# Patient Record
Sex: FEMALE | Employment: FULL TIME | URBAN - METROPOLITAN AREA
[De-identification: names, ages, dates, MRNs, and addresses within clinical notes are randomized per-mention and may not be internally consistent; named-entity substitution may affect disease eponyms.]

---

## 2017-06-27 ENCOUNTER — OP HISTORICAL/CONVERTED ENCOUNTER (OUTPATIENT)
Dept: OTHER | Age: 32
End: 2017-06-27

## 2017-06-28 ENCOUNTER — IP HISTORICAL/CONVERTED ENCOUNTER (OUTPATIENT)
Dept: OTHER | Age: 32
End: 2017-06-28

## 2019-11-14 ENCOUNTER — HOSPITAL ENCOUNTER (OUTPATIENT)
Dept: PREADMISSION TESTING | Age: 34
Discharge: HOME | End: 2019-11-14
Payer: SELF-PAY

## 2019-11-14 VITALS
HEART RATE: 66 BPM | SYSTOLIC BLOOD PRESSURE: 119 MMHG | BODY MASS INDEX: 27.91 KG/M2 | OXYGEN SATURATION: 99 % | HEIGHT: 68 IN | RESPIRATION RATE: 16 BRPM | WEIGHT: 184.13 LBS | TEMPERATURE: 98 F | DIASTOLIC BLOOD PRESSURE: 76 MMHG

## 2019-11-14 LAB
ABO + RH BLD: NORMAL
APTT PPP: 27.9 SEC (ref 22.1–32)
BASOPHILS # BLD: 0.1 K/UL (ref 0–0.1)
BASOPHILS NFR BLD: 1 % (ref 0–1)
BLOOD GROUP ANTIBODIES SERPL: NORMAL
DIFFERENTIAL METHOD BLD: NORMAL
EOSINOPHIL # BLD: 0.1 K/UL (ref 0–0.4)
EOSINOPHIL NFR BLD: 1 % (ref 0–7)
ERYTHROCYTE [DISTWIDTH] IN BLOOD BY AUTOMATED COUNT: 11.6 % (ref 11.5–14.5)
HCT VFR BLD AUTO: 41.8 % (ref 35–47)
HGB BLD-MCNC: 13.6 G/DL (ref 11.5–16)
IMM GRANULOCYTES # BLD AUTO: 0 K/UL (ref 0–0.04)
IMM GRANULOCYTES NFR BLD AUTO: 0 % (ref 0–0.5)
INR PPP: 1 (ref 0.9–1.1)
LYMPHOCYTES # BLD: 2.6 K/UL (ref 0.8–3.5)
LYMPHOCYTES NFR BLD: 41 % (ref 12–49)
MCH RBC QN AUTO: 29.7 PG (ref 26–34)
MCHC RBC AUTO-ENTMCNC: 32.5 G/DL (ref 30–36.5)
MCV RBC AUTO: 91.3 FL (ref 80–99)
MONOCYTES # BLD: 0.4 K/UL (ref 0–1)
MONOCYTES NFR BLD: 7 % (ref 5–13)
NEUTS SEG # BLD: 3.1 K/UL (ref 1.8–8)
NEUTS SEG NFR BLD: 50 % (ref 32–75)
NRBC # BLD: 0 K/UL (ref 0–0.01)
NRBC BLD-RTO: 0 PER 100 WBC
PLATELET # BLD AUTO: 295 K/UL (ref 150–400)
PMV BLD AUTO: 10.5 FL (ref 8.9–12.9)
PREALB SERPL-MCNC: 25.3 MG/DL (ref 20–40)
PROTHROMBIN TIME: 10.2 SEC (ref 9–11.1)
RBC # BLD AUTO: 4.58 M/UL (ref 3.8–5.2)
SPECIMEN EXP DATE BLD: NORMAL
THERAPEUTIC RANGE,PTTT: NORMAL SECS (ref 58–77)
WBC # BLD AUTO: 6.3 K/UL (ref 3.6–11)

## 2019-11-14 PROCEDURE — 84134 ASSAY OF PREALBUMIN: CPT

## 2019-11-14 PROCEDURE — 85730 THROMBOPLASTIN TIME PARTIAL: CPT

## 2019-11-14 PROCEDURE — 36415 COLL VENOUS BLD VENIPUNCTURE: CPT

## 2019-11-14 PROCEDURE — 85610 PROTHROMBIN TIME: CPT

## 2019-11-14 PROCEDURE — 85025 COMPLETE CBC W/AUTO DIFF WBC: CPT

## 2019-11-14 PROCEDURE — 86900 BLOOD TYPING SEROLOGIC ABO: CPT

## 2019-11-14 RX ORDER — ACETAMINOPHEN 325 MG/1
650 TABLET ORAL AS NEEDED
COMMUNITY
End: 2021-06-26

## 2019-11-14 RX ORDER — ALPRAZOLAM 0.25 MG/1
0.25 TABLET ORAL AS NEEDED
COMMUNITY
End: 2021-06-26

## 2019-11-14 RX ORDER — PEDIATRIC MULTIVITAMIN NO.17
2 TABLET,CHEWABLE ORAL DAILY
COMMUNITY
End: 2021-06-26

## 2019-11-14 RX ORDER — ADHESIVE BANDAGE
15 BANDAGE TOPICAL AS NEEDED
COMMUNITY
End: 2021-06-26

## 2019-11-14 NOTE — PERIOP NOTES
[address] , [address] [facility]   MAIN OR                                  [phone]   MAIN PRE OP                          [phone]                                                                                AMBULATORY PRE OP          [phone]  PRE-ADMISSION TESTING    [phone]     Surgery Date:   Monday November 25, 2019        Is surgery arrival time given by surgeon? NO  If NO, [address] staff will call you between 3 and 7pm the day before your surgery with your arrival time. (If your surgery is on a Monday, we will call you the Friday before.)    Call [phone] after 7pm Monday-Friday if you did not receive this call. INSTRUCTIONS BEFORE YOUR SURGERY   When You  Arrive Arrive at the [address]nd [address] [facility] on the day of your surgery  Have your insurance card, photo ID, and any copayment (if needed)   Food   and   Drink NO food or drink after midnight the night before surgery    This means NO water, gum, mints, coffee, juice, etc.  No alcohol (beer, wine, liquor) 24 hours before and after surgery   Medications to   TAKE   Morning of Surgery MEDICATIONS TO TAKE THE MORNING OF SURGERY WITH A SIP OF WATER:    Xanax if needed   Medications  To  STOP      7 days before surgery  Non-Steroidal anti-inflammatory Drugs (NSAID's): for example, Ibuprofen (Advil, Motrin), Naproxen (Aleve)   Aspirin, if taking for pain    Herbal supplements, vitamins, and fish oil   Other:  (Pain medications not listed above, including Tylenol may be taken)   Blood  Thinners  If you take  Aspirin, Plavix, Coumadin, or any blood-thinning or anti-blood clot medicine, talk to the doctor who prescribed the medications for pre-operative instructions.    Bathing Clothing  Jewelry  Valuables      If you shower the morning of surgery, please do not apply anything to your skin (lotions, powders, deodorant, or makeup, especially celio)   Follow Chlorhexidine Care Fusion body wash instructions provided to you during PAT appointment. Begin 3 days prior to surgery.  Do not shave or trim anywhere 24 hours before surgery   Wear your hair loose or down; no pony-tails, buns, or metal hair clips   Wear loose, comfortable, clean clothes   Wear glasses instead of contacts   Leave money, valuables, and jewelry, including body piercings, at home   Going Home - or Spending the Night  SAME-DAY SURGERY: You must have a responsible adult drive you home and stay with you 24 hours after surgery   ADMITS: If your doctor is keeping you in the hospital after surgery, leave personal belongings/luggage in your car until you have a hospital room number. Hospital discharge time is 12 noon  Drivers must be here before 12 noon unless you are told differently   Special Instructions   Special Instructions:  · Use Chlorhexidine Care Fusion wash and sponges 3 days prior to surgery as instructed. · Incentive spirometer given with instructions to practice at home and bring back to the hospital on the day of surgery. · [facility] will contact you if your Hemoglobin A1C is greater than 7.5. · Ensure/Glucerna  sample, nutritional information, and Ensure/Glucerna coupon given. · Pain pamphlet and Call Don't Fall reminder reviewed with patient. ·  parking is complimentary Monday - Friday 7 am - 5 pm  · Bring PTA Medication list day of surgery with the last doses taken documented     Follow all instructions so your surgery wont be cancelled. Please, be on time. If a situation occurs and you are delayed the day of surgery, call  [phone]. If your physical condition changes (like a fever, cold, flu, etc.) call your surgeon. Home medication(s) reviewed and verified verbally with patient during PAT appointment. The patient was contacted  in person.    The patient verbalizes understanding of all instructions and does not  need reinforcement.

## 2019-11-15 NOTE — H&P
Preoperative Evaluation                     History and Physical with Surgical Risk Stratification     11/14/2019    CC: Cosmetic  Surgery: Bilateral breast lift with Implant and Abdominoplasty     HPI:   Richard Urbina is a 36 y.o. female referred for pre-operative evaluation by Dr. Sukumar Abdalla for surgery on 11/25/19. Ms. Fariha Cheatham states she had gastric bypass surgery and has lost a tremendous amount of weight causing excessive skin to hang. She has already had the skin removed from her arms and now wishes to have her stomach and breasts done. The patient was evaluated in the surgeon's office and it was determined that the most appropriate plan of care is to proceed with surgical intervention. Patient's PCP Mary Leigh MD    Review of Systems     Constitutional: Negative for chills and fever  HENT: Negative for congestion and sore throat  Eyes: negative for blurred vision and double vision  Respiratory: Negative for cough, shortness of breath and wheezing  Mouth: Negative for loose, broken or chipped teeth. Cardiovascular: Negative for chest pain and palpitations  Gastrointestinal: Negative for abdominal pain, constipation, diarrhea and nausea  Genitourinary: Negative for dysuria and hematuria  Musculoskeletal: Negative for joint pain  Skin: Negative for rash, open wounds. Negative for bruises easily  Neurological: Negative for dizziness, tremors and headaches  Psychiatric: Negative for depression. The patient is not nervous/anxious.     Inherent Risk of Surgery     Surgical risk:  Intermediate  Low:  EIntermediate:   abdominal,High:    Patient Cardiac Risk Assessment     Revised Cardiac Risk Index (RCRI)    Rate if cardiac death, nonfatal MI, nonfatal cardiac arrest by number of risk factor- 0.4%    VARSHA/AHA 2007 Guidelines:   1) Surgery Emergency, Non-cardiac -> to surgery  2) If not, look at clinical predictors    Major Intermediate Minor       Blood Thinner: None    METS     >4 METS Climb a flight of stairs or a hill Walk on level ground at 4 mph Run a short distance Heavy work around house (scrub floors, move furniture)     Other Risk Factors:   Screening for ETOH use:  Done and low risk  Smoking status:   None    Personal or FH of bleeding problems:  No  Personal or FH of blood clots:  No  Personal or FH of anesthesia problems:   No    Pulmonary Risk:  Asthma or COPD:  No  Body mass index is 28.41 kg/m². Known MINDY:  No    Past Medical, Surgical, Social History     Allergies: Allergies   Allergen Reactions    Ayala Other (comments)     Mouth sores       Patient did bring in medication list/bottles to review.  Medications were reviewed verbally    Past Medical History:   Diagnosis Date    Anxiety     Constipation      Past Surgical History:   Procedure Laterality Date    HX ABDOMINAL LAPAROSCOPY N/A     HX GASTRIC BYPASS N/A     HX OTHER SURGICAL Bilateral 2019    Brachioplasty    HX TENDON / LIGAMENT TRANSPLANT Left 2016    Foot- with hardware    HX TONSILLECTOMY       Social History     Tobacco Use    Smoking status: Former Smoker     Packs/day: 1.00     Years: 12.00     Pack years: 12.00     Types: Cigarettes     Last attempt to quit: 2016     Years since quittin.9    Smokeless tobacco: Never Used   Substance Use Topics    Alcohol use: Yes     Comment: occ    Drug use: Never     Family History   Problem Relation Age of Onset    No Known Problems Mother     High Cholesterol Father     Diabetes Father     Hypertension Father     Malignant Hyperthermia Neg Hx        Objective     Vitals:    19 1012   BP: 119/76   Pulse: 66   Resp: 16   Temp: 98 °F (36.7 °C)   SpO2: 99%   Weight: 83.5 kg (184 lb 2 oz)   Height: 5' 7.5\" (1.715 m)       Constitutional:  Appears well,  No Acute Distress, Vitals noted  Psychiatric:   Affect normal, Alert and Oriented to person/place/time    Eyes:   Pupils equally round and reactive, EOMI, conjunctiva clear, eyelids normal  ENT:   External ears and nose normal/lips, teeth normal, gums normal, TMs and Orophyarynx normal  Neck:   General inspection and Thyroid normal.  No abnormal cervical or supraclavicular nodes    Lungs:   Clear to auscultation, good respiratory effort  Heart: Ausculation normal.  Regular rhythm. No cardiac murmurs. No carotid bruits or palpable thrills  Chest wall normal  Musculoskeletal: Normal gait  Extremities:   Without edema, good peripheral pulses  Skin:   Warm to palpation, without rashes, bruising, or suspicious lesions     Recent Results (from the past 72 hour(s))   CBC WITH AUTOMATED DIFF    Collection Time: 11/14/19 10:56 AM   Result Value Ref Range    WBC 6.3 3.6 - 11.0 K/uL    RBC 4.58 3.80 - 5.20 M/uL    HGB 13.6 11.5 - 16.0 g/dL    HCT 41.8 35.0 - 47.0 %    MCV 91.3 80.0 - 99.0 FL    MCH 29.7 26.0 - 34.0 PG    MCHC 32.5 30.0 - 36.5 g/dL    RDW 11.6 11.5 - 14.5 %    PLATELET 303 633 - 757 K/uL    MPV 10.5 8.9 - 12.9 FL    NRBC 0.0 0  WBC    ABSOLUTE NRBC 0.00 0.00 - 0.01 K/uL    NEUTROPHILS 50 32 - 75 %    LYMPHOCYTES 41 12 - 49 %    MONOCYTES 7 5 - 13 %    EOSINOPHILS 1 0 - 7 %    BASOPHILS 1 0 - 1 %    IMMATURE GRANULOCYTES 0 0.0 - 0.5 %    ABS. NEUTROPHILS 3.1 1.8 - 8.0 K/UL    ABS. LYMPHOCYTES 2.6 0.8 - 3.5 K/UL    ABS. MONOCYTES 0.4 0.0 - 1.0 K/UL    ABS. EOSINOPHILS 0.1 0.0 - 0.4 K/UL    ABS. BASOPHILS 0.1 0.0 - 0.1 K/UL    ABS. IMM.  GRANS. 0.0 0.00 - 0.04 K/UL    DF AUTOMATED     PREALBUMIN    Collection Time: 11/14/19 10:56 AM   Result Value Ref Range    Prealbumin 25.3 20.0 - 40.0 mg/dL   PROTHROMBIN TIME + INR    Collection Time: 11/14/19 10:56 AM   Result Value Ref Range    INR 1.0 0.9 - 1.1      Prothrombin time 10.2 9.0 - 11.1 sec   PTT    Collection Time: 11/14/19 10:56 AM   Result Value Ref Range    aPTT 27.9 22.1 - 32.0 sec    aPTT, therapeutic range     58.0 - 77.0 SECS   TYPE & SCREEN    Collection Time: 11/14/19 10:56 AM   Result Value Ref Range    Crossmatch Expiration 11/28/2019     ABO/Rh(D) O POSITIVE     Antibody screen NEG        Assessment and Plan     Assessment/Plan:   1) Cosmetic  2) Pre-Operative Evaluation    Labs reviewed. Preoperative Clearance  Per RCRI, the patient has a 0.4% risk of cardiac death, nonfatal MI, nonfatal cardiac arrest based on no risk factors. Per ACC/AHA guidelines, patient is low risk for a(n) intermediate risk surgery and may proceed to planned surgery with the above noted risk.     Jose Orantes NP

## 2019-11-22 ENCOUNTER — ANESTHESIA EVENT (OUTPATIENT)
Dept: SURGERY | Age: 34
End: 2019-11-22
Payer: SELF-PAY

## 2019-11-24 NOTE — ANESTHESIA PREPROCEDURE EVALUATION
Relevant Problems   No relevant active problems       Anesthetic History   No history of anesthetic complications            Review of Systems / Medical History  Patient summary reviewed and pertinent labs reviewed    Pulmonary  Within defined limits                 Neuro/Psych   Within defined limits           Cardiovascular                  Exercise tolerance: >4 METS     GI/Hepatic/Renal  Within defined limits              Endo/Other        Obesity     Other Findings   Comments: Gastric Bypass 2017, with 150 lbs  weight loss.          Physical Exam    Airway  Mallampati: II  TM Distance: 4 - 6 cm  Neck ROM: normal range of motion   Mouth opening: Normal     Cardiovascular    Rhythm: regular  Rate: normal         Dental  No notable dental hx       Pulmonary  Breath sounds clear to auscultation               Abdominal  GI exam deferred       Other Findings            Anesthetic Plan    ASA: 2  Anesthesia type: general          Induction: Intravenous  Anesthetic plan and risks discussed with: Patient

## 2019-11-25 ENCOUNTER — HOSPITAL ENCOUNTER (OUTPATIENT)
Age: 34
Discharge: HOME | End: 2019-11-26
Attending: PLASTIC SURGERY | Admitting: PLASTIC SURGERY
Payer: SELF-PAY

## 2019-11-25 ENCOUNTER — ANESTHESIA (OUTPATIENT)
Dept: SURGERY | Age: 34
End: 2019-11-25
Payer: SELF-PAY

## 2019-11-25 PROBLEM — Z98.890 S/P ABDOMINOPLASTY: Status: ACTIVE | Noted: 2019-11-25

## 2019-11-25 LAB — HCG UR QL: NEGATIVE

## 2019-11-25 PROCEDURE — 74011000250 HC RX REV CODE- 250: Performed by: PLASTIC SURGERY

## 2019-11-25 PROCEDURE — 99218 HC RM OBSERVATION: CPT

## 2019-11-25 PROCEDURE — 74011250636 HC RX REV CODE- 250/636: Performed by: PLASTIC SURGERY

## 2019-11-25 PROCEDURE — 77030040506 HC DRN WND MDII -A: Performed by: PLASTIC SURGERY

## 2019-11-25 PROCEDURE — 77030011264 HC ELECTRD BLD EXT COVD -A: Performed by: PLASTIC SURGERY

## 2019-11-25 PROCEDURE — 77030002991 HC SUT QUILL SSPC -B: Performed by: PLASTIC SURGERY

## 2019-11-25 PROCEDURE — 77030036554: Performed by: PLASTIC SURGERY

## 2019-11-25 PROCEDURE — 77030002986 HC SUT PROL J&J -A: Performed by: PLASTIC SURGERY

## 2019-11-25 PROCEDURE — 77030008534 HC TBNG LIPOSUC BYRO -B: Performed by: PLASTIC SURGERY

## 2019-11-25 PROCEDURE — 77030002966 HC SUT PDS J&J -A: Performed by: PLASTIC SURGERY

## 2019-11-25 PROCEDURE — 77030008467 HC STPLR SKN COVD -B: Performed by: PLASTIC SURGERY

## 2019-11-25 PROCEDURE — 77030011267 HC ELECTRD BLD COVD -A: Performed by: PLASTIC SURGERY

## 2019-11-25 PROCEDURE — 77030011266 HC ELECTRD BLD INSL COVD -A: Performed by: PLASTIC SURGERY

## 2019-11-25 PROCEDURE — 74011000250 HC RX REV CODE- 250: Performed by: NURSE ANESTHETIST, CERTIFIED REGISTERED

## 2019-11-25 PROCEDURE — 76210000036 HC AMBSU PH I REC 1.5 TO 2 HR: Performed by: PLASTIC SURGERY

## 2019-11-25 PROCEDURE — 77030002888 HC SUT CHRMC J&J -A: Performed by: PLASTIC SURGERY

## 2019-11-25 PROCEDURE — 77030008526 HC TBNG ASPIR DISP MCRA -B: Performed by: PLASTIC SURGERY

## 2019-11-25 PROCEDURE — 77030033138 HC SUT PGA STRATFX J&J -B: Performed by: PLASTIC SURGERY

## 2019-11-25 PROCEDURE — 77030011283 HC ELECTRD NDL COVD -A: Performed by: PLASTIC SURGERY

## 2019-11-25 PROCEDURE — 76210000017 HC OR PH I REC 1.5 TO 2 HR

## 2019-11-25 PROCEDURE — 74011250636 HC RX REV CODE- 250/636: Performed by: ANESTHESIOLOGY

## 2019-11-25 PROCEDURE — 77030031139 HC SUT VCRL2 J&J -A: Performed by: PLASTIC SURGERY

## 2019-11-25 PROCEDURE — 74011250637 HC RX REV CODE- 250/637: Performed by: PLASTIC SURGERY

## 2019-11-25 PROCEDURE — 77030040361 HC SLV COMPR DVT MDII -B

## 2019-11-25 PROCEDURE — 77030002933 HC SUT MCRYL J&J -A: Performed by: PLASTIC SURGERY

## 2019-11-25 PROCEDURE — 77030005513 HC CATH URETH FOL11 MDII -B: Performed by: PLASTIC SURGERY

## 2019-11-25 PROCEDURE — 77030002996 HC SUT SLK J&J -A: Performed by: PLASTIC SURGERY

## 2019-11-25 PROCEDURE — 74011000250 HC RX REV CODE- 250: Performed by: ANESTHESIOLOGY

## 2019-11-25 PROCEDURE — 77030013079 HC BLNKT BAIR HGGR 3M -A: Performed by: ANESTHESIOLOGY

## 2019-11-25 PROCEDURE — 74011000272 HC RX REV CODE- 272: Performed by: PLASTIC SURGERY

## 2019-11-25 PROCEDURE — 76060000074: Performed by: PLASTIC SURGERY

## 2019-11-25 PROCEDURE — 77030022704 HC SUT VLOC COVD -B: Performed by: PLASTIC SURGERY

## 2019-11-25 PROCEDURE — 76060000071 HC AMB SURG ANES 6 TO 6.5 HR: Performed by: PLASTIC SURGERY

## 2019-11-25 PROCEDURE — 76030000012 HC AMB SURG OR TIME 6 TO 6.5: Performed by: PLASTIC SURGERY

## 2019-11-25 PROCEDURE — 74011000258 HC RX REV CODE- 258: Performed by: PLASTIC SURGERY

## 2019-11-25 PROCEDURE — 81025 URINE PREGNANCY TEST: CPT

## 2019-11-25 PROCEDURE — 77030018673: Performed by: PLASTIC SURGERY

## 2019-11-25 PROCEDURE — 77030040504 HC DRN WND MDII -B: Performed by: PLASTIC SURGERY

## 2019-11-25 PROCEDURE — 76030000013 HC AMB SURG OR TIME ADDL 0.5: Performed by: PLASTIC SURGERY

## 2019-11-25 PROCEDURE — 77030018836 HC SOL IRR NACL ICUM -A: Performed by: PLASTIC SURGERY

## 2019-11-25 PROCEDURE — 77030008684 HC TU ET CUF COVD -B: Performed by: ANESTHESIOLOGY

## 2019-11-25 PROCEDURE — 74011250636 HC RX REV CODE- 250/636: Performed by: NURSE ANESTHETIST, CERTIFIED REGISTERED

## 2019-11-25 PROCEDURE — 77030013358: Performed by: PLASTIC SURGERY

## 2019-11-25 PROCEDURE — 77030034479 HC ADH SKN CLSR PRINEO J&J -B: Performed by: PLASTIC SURGERY

## 2019-11-25 PROCEDURE — 77030008463 HC STPLR SKN PROX J&J -B: Performed by: PLASTIC SURGERY

## 2019-11-25 RX ORDER — ONDANSETRON 2 MG/ML
INJECTION INTRAMUSCULAR; INTRAVENOUS AS NEEDED
Status: DISCONTINUED | OUTPATIENT
Start: 2019-11-25 | End: 2019-11-25 | Stop reason: HOSPADM

## 2019-11-25 RX ORDER — SODIUM CHLORIDE, SODIUM LACTATE, POTASSIUM CHLORIDE, CALCIUM CHLORIDE 600; 310; 30; 20 MG/100ML; MG/100ML; MG/100ML; MG/100ML
125 INJECTION, SOLUTION INTRAVENOUS CONTINUOUS
Status: DISCONTINUED | OUTPATIENT
Start: 2019-11-25 | End: 2019-11-25 | Stop reason: HOSPADM

## 2019-11-25 RX ORDER — MIDAZOLAM HYDROCHLORIDE 1 MG/ML
INJECTION, SOLUTION INTRAMUSCULAR; INTRAVENOUS AS NEEDED
Status: DISCONTINUED | OUTPATIENT
Start: 2019-11-25 | End: 2019-11-25 | Stop reason: HOSPADM

## 2019-11-25 RX ORDER — LIDOCAINE HYDROCHLORIDE 10 MG/ML
0.1 INJECTION, SOLUTION EPIDURAL; INFILTRATION; INTRACAUDAL; PERINEURAL AS NEEDED
Status: DISCONTINUED | OUTPATIENT
Start: 2019-11-25 | End: 2019-11-25 | Stop reason: HOSPADM

## 2019-11-25 RX ORDER — ALBUTEROL SULFATE 0.83 MG/ML
2.5 SOLUTION RESPIRATORY (INHALATION) AS NEEDED
Status: DISCONTINUED | OUTPATIENT
Start: 2019-11-25 | End: 2019-11-25 | Stop reason: HOSPADM

## 2019-11-25 RX ORDER — OXYCODONE HYDROCHLORIDE 5 MG/1
10 TABLET ORAL
Status: DISCONTINUED | OUTPATIENT
Start: 2019-11-25 | End: 2019-11-26

## 2019-11-25 RX ORDER — SODIUM CHLORIDE 0.9 % (FLUSH) 0.9 %
5-40 SYRINGE (ML) INJECTION EVERY 8 HOURS
Status: DISCONTINUED | OUTPATIENT
Start: 2019-11-25 | End: 2019-11-26 | Stop reason: HOSPADM

## 2019-11-25 RX ORDER — SODIUM CHLORIDE 0.9 % (FLUSH) 0.9 %
5-40 SYRINGE (ML) INJECTION AS NEEDED
Status: DISCONTINUED | OUTPATIENT
Start: 2019-11-25 | End: 2019-11-26 | Stop reason: HOSPADM

## 2019-11-25 RX ORDER — PHENYLEPHRINE HCL IN 0.9% NACL 0.4MG/10ML
SYRINGE (ML) INTRAVENOUS AS NEEDED
Status: DISCONTINUED | OUTPATIENT
Start: 2019-11-25 | End: 2019-11-25 | Stop reason: HOSPADM

## 2019-11-25 RX ORDER — ROCURONIUM BROMIDE 10 MG/ML
INJECTION, SOLUTION INTRAVENOUS AS NEEDED
Status: DISCONTINUED | OUTPATIENT
Start: 2019-11-25 | End: 2019-11-25 | Stop reason: HOSPADM

## 2019-11-25 RX ORDER — PROPOFOL 10 MG/ML
INJECTION, EMULSION INTRAVENOUS AS NEEDED
Status: DISCONTINUED | OUTPATIENT
Start: 2019-11-25 | End: 2019-11-25 | Stop reason: HOSPADM

## 2019-11-25 RX ORDER — GLYCOPYRROLATE 0.2 MG/ML
INJECTION INTRAMUSCULAR; INTRAVENOUS AS NEEDED
Status: DISCONTINUED | OUTPATIENT
Start: 2019-11-25 | End: 2019-11-25 | Stop reason: HOSPADM

## 2019-11-25 RX ORDER — CEFAZOLIN SODIUM/WATER 2 G/20 ML
2 SYRINGE (ML) INTRAVENOUS EVERY 8 HOURS
Status: DISCONTINUED | OUTPATIENT
Start: 2019-11-25 | End: 2019-11-25

## 2019-11-25 RX ORDER — MORPHINE SULFATE 2 MG/ML
2 INJECTION, SOLUTION INTRAMUSCULAR; INTRAVENOUS
Status: DISCONTINUED | OUTPATIENT
Start: 2019-11-25 | End: 2019-11-26 | Stop reason: HOSPADM

## 2019-11-25 RX ORDER — ONDANSETRON 2 MG/ML
4 INJECTION INTRAMUSCULAR; INTRAVENOUS AS NEEDED
Status: DISCONTINUED | OUTPATIENT
Start: 2019-11-25 | End: 2019-11-25 | Stop reason: HOSPADM

## 2019-11-25 RX ORDER — DEXAMETHASONE SODIUM PHOSPHATE 4 MG/ML
INJECTION, SOLUTION INTRA-ARTICULAR; INTRALESIONAL; INTRAMUSCULAR; INTRAVENOUS; SOFT TISSUE AS NEEDED
Status: DISCONTINUED | OUTPATIENT
Start: 2019-11-25 | End: 2019-11-25 | Stop reason: HOSPADM

## 2019-11-25 RX ORDER — DIPHENHYDRAMINE HYDROCHLORIDE 50 MG/ML
12.5 INJECTION, SOLUTION INTRAMUSCULAR; INTRAVENOUS AS NEEDED
Status: DISCONTINUED | OUTPATIENT
Start: 2019-11-25 | End: 2019-11-25 | Stop reason: HOSPADM

## 2019-11-25 RX ORDER — HEPARIN SODIUM 5000 [USP'U]/ML
5000 INJECTION, SOLUTION INTRAVENOUS; SUBCUTANEOUS EVERY 8 HOURS
Status: DISCONTINUED | OUTPATIENT
Start: 2019-11-26 | End: 2019-11-26 | Stop reason: HOSPADM

## 2019-11-25 RX ORDER — SODIUM CHLORIDE, SODIUM LACTATE, POTASSIUM CHLORIDE, CALCIUM CHLORIDE 600; 310; 30; 20 MG/100ML; MG/100ML; MG/100ML; MG/100ML
150 INJECTION, SOLUTION INTRAVENOUS CONTINUOUS
Status: DISCONTINUED | OUTPATIENT
Start: 2019-11-25 | End: 2019-11-25 | Stop reason: HOSPADM

## 2019-11-25 RX ORDER — HYDROMORPHONE HYDROCHLORIDE 2 MG/ML
INJECTION, SOLUTION INTRAMUSCULAR; INTRAVENOUS; SUBCUTANEOUS AS NEEDED
Status: DISCONTINUED | OUTPATIENT
Start: 2019-11-25 | End: 2019-11-25 | Stop reason: HOSPADM

## 2019-11-25 RX ORDER — NEOSTIGMINE METHYLSULFATE 1 MG/ML
INJECTION, SOLUTION INTRAVENOUS AS NEEDED
Status: DISCONTINUED | OUTPATIENT
Start: 2019-11-25 | End: 2019-11-25 | Stop reason: HOSPADM

## 2019-11-25 RX ORDER — MORPHINE SULFATE 2 MG/ML
2 INJECTION, SOLUTION INTRAMUSCULAR; INTRAVENOUS
Status: DISCONTINUED | OUTPATIENT
Start: 2019-11-25 | End: 2019-11-25

## 2019-11-25 RX ORDER — OXYCODONE AND ACETAMINOPHEN 5; 325 MG/1; MG/1
1 TABLET ORAL
Status: DISCONTINUED | OUTPATIENT
Start: 2019-11-25 | End: 2019-11-26 | Stop reason: HOSPADM

## 2019-11-25 RX ORDER — FENTANYL CITRATE 50 UG/ML
INJECTION, SOLUTION INTRAMUSCULAR; INTRAVENOUS AS NEEDED
Status: DISCONTINUED | OUTPATIENT
Start: 2019-11-25 | End: 2019-11-25 | Stop reason: HOSPADM

## 2019-11-25 RX ORDER — HYDROMORPHONE HYDROCHLORIDE 1 MG/ML
0.5 INJECTION, SOLUTION INTRAMUSCULAR; INTRAVENOUS; SUBCUTANEOUS
Status: DISCONTINUED | OUTPATIENT
Start: 2019-11-25 | End: 2019-11-25 | Stop reason: HOSPADM

## 2019-11-25 RX ORDER — ONDANSETRON 2 MG/ML
4 INJECTION INTRAMUSCULAR; INTRAVENOUS
Status: DISCONTINUED | OUTPATIENT
Start: 2019-11-25 | End: 2019-11-26 | Stop reason: HOSPADM

## 2019-11-25 RX ORDER — DEXAMETHASONE SODIUM PHOSPHATE 4 MG/ML
10 INJECTION, SOLUTION INTRA-ARTICULAR; INTRALESIONAL; INTRAMUSCULAR; INTRAVENOUS; SOFT TISSUE ONCE
Status: DISCONTINUED | OUTPATIENT
Start: 2019-11-25 | End: 2019-11-25 | Stop reason: HOSPADM

## 2019-11-25 RX ADMIN — PROPOFOL 50 MG: 10 INJECTION, EMULSION INTRAVENOUS at 08:26

## 2019-11-25 RX ADMIN — FENTANYL CITRATE 50 MCG: 50 INJECTION, SOLUTION INTRAMUSCULAR; INTRAVENOUS at 11:56

## 2019-11-25 RX ADMIN — Medication 80 MCG: at 13:46

## 2019-11-25 RX ADMIN — CEFAZOLIN 2 G: 1 INJECTION, POWDER, FOR SOLUTION INTRAMUSCULAR; INTRAVENOUS at 08:01

## 2019-11-25 RX ADMIN — FENTANYL CITRATE 100 MCG: 50 INJECTION, SOLUTION INTRAMUSCULAR; INTRAVENOUS at 07:49

## 2019-11-25 RX ADMIN — PROPOFOL 40 MG: 10 INJECTION, EMULSION INTRAVENOUS at 16:13

## 2019-11-25 RX ADMIN — Medication 80 MCG: at 12:01

## 2019-11-25 RX ADMIN — CEFAZOLIN 1 G: 1 INJECTION, POWDER, FOR SOLUTION INTRAMUSCULAR; INTRAVENOUS at 13:33

## 2019-11-25 RX ADMIN — SODIUM CHLORIDE, SODIUM LACTATE, POTASSIUM CHLORIDE, AND CALCIUM CHLORIDE 125 ML/HR: 600; 310; 30; 20 INJECTION, SOLUTION INTRAVENOUS at 18:12

## 2019-11-25 RX ADMIN — GLYCOPYRROLATE 0.4 MG: 0.2 INJECTION, SOLUTION INTRAMUSCULAR; INTRAVENOUS at 16:06

## 2019-11-25 RX ADMIN — Medication 40 MCG: at 11:04

## 2019-11-25 RX ADMIN — DEXAMETHASONE SODIUM PHOSPHATE 8 MG: 4 INJECTION, SOLUTION INTRAMUSCULAR; INTRAVENOUS at 08:10

## 2019-11-25 RX ADMIN — OXYCODONE HYDROCHLORIDE 10 MG: 5 TABLET ORAL at 23:40

## 2019-11-25 RX ADMIN — HYDROMORPHONE HYDROCHLORIDE 1 MG: 2 INJECTION INTRAMUSCULAR; INTRAVENOUS; SUBCUTANEOUS at 14:41

## 2019-11-25 RX ADMIN — SODIUM CHLORIDE, POTASSIUM CHLORIDE, SODIUM LACTATE AND CALCIUM CHLORIDE: 600; 310; 30; 20 INJECTION, SOLUTION INTRAVENOUS at 08:05

## 2019-11-25 RX ADMIN — MIDAZOLAM 2 MG: 1 INJECTION INTRAMUSCULAR; INTRAVENOUS at 07:45

## 2019-11-25 RX ADMIN — ROCURONIUM BROMIDE 20 MG: 50 INJECTION, SOLUTION INTRAVENOUS at 11:07

## 2019-11-25 RX ADMIN — PROPOFOL 30 MG: 10 INJECTION, EMULSION INTRAVENOUS at 15:59

## 2019-11-25 RX ADMIN — ROCURONIUM BROMIDE 50 MG: 50 INJECTION, SOLUTION INTRAVENOUS at 07:50

## 2019-11-25 RX ADMIN — Medication 80 MCG: at 09:13

## 2019-11-25 RX ADMIN — Medication 40 MCG: at 14:50

## 2019-11-25 RX ADMIN — WATER 2 G: 1 INJECTION INTRAMUSCULAR; INTRAVENOUS; SUBCUTANEOUS at 21:22

## 2019-11-25 RX ADMIN — Medication 80 MCG: at 11:19

## 2019-11-25 RX ADMIN — ONDANSETRON 4 MG: 2 INJECTION INTRAMUSCULAR; INTRAVENOUS at 21:34

## 2019-11-25 RX ADMIN — FENTANYL CITRATE 100 MCG: 50 INJECTION, SOLUTION INTRAMUSCULAR; INTRAVENOUS at 11:05

## 2019-11-25 RX ADMIN — ROCURONIUM BROMIDE 30 MG: 50 INJECTION, SOLUTION INTRAVENOUS at 08:26

## 2019-11-25 RX ADMIN — Medication 10 ML: at 21:22

## 2019-11-25 RX ADMIN — PROPOFOL 200 MG: 10 INJECTION, EMULSION INTRAVENOUS at 07:49

## 2019-11-25 RX ADMIN — Medication 80 MCG: at 08:49

## 2019-11-25 RX ADMIN — PROPOFOL 25 MG: 10 INJECTION, EMULSION INTRAVENOUS at 11:08

## 2019-11-25 RX ADMIN — ONDANSETRON 4 MG: 2 INJECTION INTRAMUSCULAR; INTRAVENOUS at 08:10

## 2019-11-25 RX ADMIN — Medication 80 MCG: at 14:21

## 2019-11-25 RX ADMIN — Medication 3 MG: at 16:07

## 2019-11-25 RX ADMIN — HYDROMORPHONE HYDROCHLORIDE 0.5 MG: 1 INJECTION, SOLUTION INTRAMUSCULAR; INTRAVENOUS; SUBCUTANEOUS at 19:34

## 2019-11-25 RX ADMIN — PROPOFOL 25 MG: 10 INJECTION, EMULSION INTRAVENOUS at 14:35

## 2019-11-25 RX ADMIN — HYDROMORPHONE HYDROCHLORIDE 1 MG: 2 INJECTION INTRAMUSCULAR; INTRAVENOUS; SUBCUTANEOUS at 16:30

## 2019-11-25 NOTE — ANESTHESIA POSTPROCEDURE EVALUATION
Procedure(s):  BILATERAL BREAST LIFT WITH AUGMENTATION WITH FAT GRAFTING; ABDOMINOPLASTY  ABDOMINOPLASTY. general    Anesthesia Post Evaluation      Multimodal analgesia: multimodal analgesia not used between 6 hours prior to anesthesia start to PACU discharge  Patient location during evaluation: PACU  Patient participation: complete - patient participated  Level of consciousness: awake  Pain management: adequate  Airway patency: patent  Anesthetic complications: no  Cardiovascular status: acceptable, blood pressure returned to baseline and hemodynamically stable  Respiratory status: acceptable  Hydration status: acceptable  Post anesthesia nausea and vomiting:  controlled      Vitals Value Taken Time   /68 11/25/2019  5:05 PM   Temp 36.8 °C (98.3 °F) 11/25/2019  4:36 PM   Pulse 81 11/25/2019  5:10 PM   Resp 11 11/25/2019  5:10 PM   SpO2 97 % 11/25/2019  5:10 PM   Vitals shown include unvalidated device data.

## 2019-11-25 NOTE — BRIEF OP NOTE
BRIEF OPERATIVE NOTE    Date of Procedure: 11/25/2019   Preoperative Diagnosis: COSMETIC  Postoperative Diagnosis: COSMETIC    Procedure(s):  BILATERAL BREAST LIFT WITH AUGMENTATION WITH FAT GRAFTING; ABDOMINOPLASTY  ABDOMINOPLASTY  Surgeon(s) and Role:     Germaine Hernandez MD - Primary         Surgical Assistant: Sarah Garcia    Surgical Staff:  Circ-1: Mel Owens RN  Circ-Relief: Fadia Devine RN  Registered Nurse Assistant: Eloina Khalil RN  Scrub Tech-1: Alton Kumari  Scrub Tech-Relief: [facility]  Surg Asst-1: Lorrie Sickle  Float Staff: Selena Pitts RN  Event Time In Time Out   Incision Start 3875    Incision Close 1551      Anesthesia: General   Estimated Blood Loss: 100 cc  Specimens:   ID Type Source Tests Collected by Time Destination   1 : Right Abdominal Excised Tissue Tissue Abdomen  Rodriguez Nieto MD 11/25/2019 1033 Discarded   2 : Left Abdominal Excised Tissue Tissue Abdomen  Rodriguez Nieto MD 11/25/2019 1035 Discarded   3 : Right Breast Excised Tissue Tissue Breast  Rodriguez Nieto MD 11/25/2019 1228 Discarded   4 : Left Breast Excised Tissue Tissue Breast  Rodriguez Nieto MD 11/25/2019 1230 Discarded      Findings: consistent with procedure  Complications: none  Implants: * No implants in log *

## 2019-11-26 VITALS
OXYGEN SATURATION: 97 % | HEIGHT: 68 IN | SYSTOLIC BLOOD PRESSURE: 95 MMHG | RESPIRATION RATE: 18 BRPM | WEIGHT: 185.41 LBS | BODY MASS INDEX: 28.1 KG/M2 | DIASTOLIC BLOOD PRESSURE: 58 MMHG | HEART RATE: 92 BPM | TEMPERATURE: 98.1 F

## 2019-11-26 PROCEDURE — 74011250636 HC RX REV CODE- 250/636: Performed by: PLASTIC SURGERY

## 2019-11-26 PROCEDURE — 94760 N-INVAS EAR/PLS OXIMETRY 1: CPT

## 2019-11-26 PROCEDURE — 74011250637 HC RX REV CODE- 250/637: Performed by: PLASTIC SURGERY

## 2019-11-26 PROCEDURE — 74011000250 HC RX REV CODE- 250: Performed by: PLASTIC SURGERY

## 2019-11-26 RX ORDER — OXYCODONE AND ACETAMINOPHEN 5; 325 MG/1; MG/1
2 TABLET ORAL
Status: DISCONTINUED | OUTPATIENT
Start: 2019-11-26 | End: 2019-11-26 | Stop reason: HOSPADM

## 2019-11-26 RX ADMIN — OXYCODONE HYDROCHLORIDE 10 MG: 5 TABLET ORAL at 03:50

## 2019-11-26 RX ADMIN — ONDANSETRON 4 MG: 2 INJECTION INTRAMUSCULAR; INTRAVENOUS at 05:45

## 2019-11-26 RX ADMIN — OXYCODONE HYDROCHLORIDE 10 MG: 5 TABLET ORAL at 07:44

## 2019-11-26 RX ADMIN — HEPARIN SODIUM 5000 UNITS: 5000 INJECTION INTRAVENOUS; SUBCUTANEOUS at 13:20

## 2019-11-26 RX ADMIN — WATER 2 G: 1 INJECTION INTRAMUSCULAR; INTRAVENOUS; SUBCUTANEOUS at 13:23

## 2019-11-26 RX ADMIN — HEPARIN SODIUM 5000 UNITS: 5000 INJECTION INTRAVENOUS; SUBCUTANEOUS at 05:45

## 2019-11-26 RX ADMIN — WATER 2 G: 1 INJECTION INTRAMUSCULAR; INTRAVENOUS; SUBCUTANEOUS at 05:45

## 2019-11-26 RX ADMIN — OXYCODONE HYDROCHLORIDE AND ACETAMINOPHEN 2 TABLET: 5; 325 TABLET ORAL at 16:13

## 2019-11-26 RX ADMIN — PROMETHAZINE HYDROCHLORIDE 6.25 MG: 25 INJECTION INTRAMUSCULAR; INTRAVENOUS at 08:20

## 2019-11-26 RX ADMIN — OXYCODONE HYDROCHLORIDE AND ACETAMINOPHEN 2 TABLET: 5; 325 TABLET ORAL at 12:09

## 2019-11-26 RX ADMIN — Medication 10 ML: at 05:45

## 2019-11-26 RX ADMIN — ONDANSETRON 4 MG: 2 INJECTION INTRAMUSCULAR; INTRAVENOUS at 13:16

## 2019-11-26 RX ADMIN — SODIUM CHLORIDE 1000 ML: 900 INJECTION, SOLUTION INTRAVENOUS at 14:23

## 2019-11-26 NOTE — PROGRESS NOTES
Bedside shift change report given to PAULA Clifford  (oncoming nurse) by Milagros Conteh RN (offgoing nurse). Report included the following information SBAR and Kardex.

## 2019-11-26 NOTE — PROGRESS NOTES
11/26/2019  Reason for Admission:   Lipo s/p gastric bypass (elective, cosmetic procedure)                   RRAT Score:     6                Plan for utilizing home health:    No                      Current Advanced Directive/Advance Care Plan: Not on file                         Transition of Care Plan:   1. Plan home with outpt f/u  2. Pt will arrange her own transportation. CM will follow pt until discharge.   Alyssa

## 2019-11-26 NOTE — PROGRESS NOTES
S: Patient denies any issues. Her pain is well controlled. She has been able to ambulate without assistance. She has voided multiple times. She is tolerating regular diet. Her blood pressure has been a little soft today but received IVF. She denies any dizziness or light headedness. She denies any chest pain or shortness of breath. She has been using incentive spirometry all day.     O:  AVSS, afebrile    GEN: NAD, AOX3  Breasts: chest wrap is in place, breasts are soft, incisions are c/d/i, nipples are pink and warm,  Abdomen: compression garment in place, incision is c/d/i, bilateral BASIL in place with serosanguinous drainage,  Extremities: SCDs in place and working    A/P: This is a 36year old female who is POD 1 s/p abdominoplasty with liposuction to both flanks, bilateral mastopexy and fat transfers to both breasts who is doing well.   -ok to discharge to home  -continue with BASIL care  -ambulate frequently  -keep hydrated  -given prescriptions for Keflex, Zofran, Percocet  -keep chest wrap and compression garment in place  -continue wearing BETO hose at all times until follow up visit  -f/u in clinic on Friday 11/29/19

## 2019-11-26 NOTE — DISCHARGE SUMMARY
Admission date: 11/25/19  Discharge date: 11/26/2019    Admission diagnosis: s/p massive weight loss, abdominal pannus, abdominal wall laxity,  trunk adiposity, bilateral breast ptosis  Operation: Abdominoplasty with flank liposuction, bilateral mastopexy and fat transfer to bilateral breasts 11/25/2019  Surgeon: Dr. Melisa Dye    HPI: This is a 36year old female otherwise healthy who underwent Abdominoplasty with flank liposuction, bilateral mastopexy and fat transfer to bilateral breasts on 11/25/2019. The surgery was uneventful. Patient remained in the hospital overnight for observation. Hospital course:  Patient remained stable overnight. On POD 1 her chadwick catheter was removed. She was advanced to a regular diet. She was ambulating unassisted. At the end of POD 1 patient was stable. Her pain was well controlled. She was voiding and tolerating regular diet. She was ambulating. She was ready for discharge. Discharge instructions: keep compression garment, sleep on a recliner, continue with BASIL care, refrain from heavy physical activity.      Discharge: to home  Condition: stable  Prescriptions: Keflex, Zofran, Percocet  Follow up: 11/29/19 at 12 pm

## 2019-11-26 NOTE — PROGRESS NOTES
Spiritual Care Partner Volunteer visited patient on the [facility]. Surgical unit on 11/26/19. Documented by:  Shraddha Morgan.      Paging Service: [phone])

## 2019-11-26 NOTE — OP NOTES
[facility] 80  OPERATIVE REPORT    Name:  Britton George  MR#:  [id number]  :  1985  ACCOUNT #:  [de-identified]  DATE OF SERVICE:  2019    PREOPERATIVE DIAGNOSES:  1. Abdominal wall laxity. 2.  Truncal adiposity. 3.  Bilateral breast ptosis. 4.  Micromastia. POSTOPERATIVE DIAGNOSES:  1. Abdominal wall laxity. 2.  Truncal adiposity. 3.  Bilateral breast ptosis. 4.  Micromastia. PROCEDURES PERFORMED:  1. Abdominoplasty with liposuction of flanks. 2.  Mastopexy. 3.  Fat grafting to bilateral breasts. SURGEON:  Jesús Rasmussen MD    ASSISTANT:      ANESTHESIA:  General endotracheal anesthesia. COMPLICATIONS: None. SPECIMENS REMOVED:  1. Right abdominal tissue which measures 815 g.  2.  Left abdominal tissue which measurers 566 g. IMPLANTS:  none  ESTIMATED BLOOD LOSS:  800 mL. TUMESCENT:  4 L. LIPOSUCTION DEPOSIT:  2.2 L. FAT GRAFTING TO RIGHT BREAST:  250 mL. FAT GRAFTING TO LEFT BREAST:  210 mL. URINE OUTPUT:  200 mL. IV FLUIDS:  3300 mL. DRAINS:  Two BASIL drains. DISPOSITION:  To PACU and then to be admitted to the floor for observation. CONDITION:  Stable. HISTORY OF PRESENT ILLNESS:  The patient is a 30-year-old female who is otherwise healthy and has undergone massive weight loss after a gastric bypass and she had been maintained through diet and exercise. The patient had lost about 170 pounds, which resulted in excess skin in the abdomen and trunk and bilateral ptosis. The patient was found to be a good candidate for abdominoplasty with liposuction of left flank and bilateral mastopexy and fat grafting to the breast.    PROCEDURE:  The patient was identified in the preoperative area. Markings for the abdominoplasty, the areas for the liposuction and for the bilateral mastopexies were made with the patient upright. The markings were confirmed with the patient.     The patient was taken to the operating room and placed in supine position. General endotracheal anesthesia was administered. Her chest and abdomen including the flanks were prepared and draped in sterile fashion. Schneider catheter was inserted. First, we started with the abdominoplasty. Four stab incisions were made in the lower abdomen. These incisions were used to infiltrate the lower abdomen and the flank with 4 L of tumescent solution. Then using a 5-mm cannula, we proceeded with liposuction of the lower abdomen and left flank. Once we were satisfied with the symmetry of the area, the lipoaspirate was then collected. The fat was drained and then washed with antibiotic solution and set aside for the breast procedure. Then, we proceed with an abdominoplasty. Standard horizontal incision was made from ASIS to ASIS with 15-blade. Then dissection proceeded down to the anterior rectus fascia with Bovie cautery. Dissection proceeded cephalad through the xiphoid process. Once umbilicus was encountered, it was incised from the abdominal wall and it was isolated on its stalk. Dissection of the abdominal flap proceeded through the xiphoid process here. Then, meticulous hemostasis was obtained. The abdomen was profusely irrigated and washed. Then, we proceeded with muscle plication using PDS-0 suture and that was enforced with a V-Loc suture. Using a Celina clamp, the excess tissue on the right and the left pannus was measured and then excised. Again, meticulous hemostasis was obtained. Two 15-BASIL drains were brought bilaterally and secured to the skin with 2-0 Prolene sutures. The flap was then tailor-tacked to it. The umbilicus was brought out through the abdominal flap in its new position. The umbilicus was secured in place with 3-0 Monocryl sutures and a running 4-0 chromic. The abdominoplasty incision was then closed in layers with 2-0 Vicryl for reapproximation of the superficial fascia system.   Deep dermal sutures were then placed with 3-0 Monocryl and subcuticular suture was placed with V-Loc 4-0. The incision was then covered with Prineo dressing. Then, we turned our attention to the breast.  A 42-mm cookie cutter was used to cut out the new nipple areolar complexes with a 15-blade. Circumverticular incisions were made and the skin of the pedicle was de-exteriorized. A superomedial pedicle was then elevated with dissection of bilateral pillars down to the chest wall. Meticulous hemostasis was obtained. Each breast pocket was then irrigated with antibiotic solution. The skin fillers were then sutured with 2-0 Vicryl. The nipple was brought out in its new position. At this point, the patient was sat upright to evaluate for symmetry. Once we were satisfied with the symmetry of the positions of the nipples, we proceeded with closure. All the incisions were stapled and tailor-tacked. At this point, we proceeded with fat grafting. First, we started with the right breast which was smaller. Using a 2-mm cannula, fat was grafted into the right breast into the superior pole and the medial pole. A total of 250 mL of fat was then grafted in different planes. Then, the left breast which was already slightly larger was grafted with 210 mL of fat using a 2-mm cannula again into the superior and medial poles of the breast.  We were satisfied with the symmetry of the breasts. We proceeded with closure of the incisions. The vertical incision was closed with 3-0 Monocryl and then 4-0 V-Loc sutures. A horizontal wedge of skin was also excised and closed. The nipple-areolar complex was secured in place with 3-0 Monocryl and then the 5-0 chromic sutures. A Prineo dressing was placed over the incisions. At the end of the case, all instrument counts and lap pads were correct. The patient was placed in a compression garment and her breasts were wrapped with Kerlix, fluff, and an Ace band.     Anesthesia was reversed and the patient was extubated successfully. She was transferred to PACU and will remain in the hospital overnight for observation.         Srinivas Caballero MD      [id number]  D:  11/25/2019 17:43  T:  11/26/2019 3:19  JOB #:  [id number]

## 2019-11-26 NOTE — PERIOP NOTES
TRANSFER - OUT REPORT:    Verbal report given to Anson Shea RN(name) on Sonu Tse  being transferred to 523(unit) for routine post - op       Report consisted of patients Situation, Background, Assessment and   Recommendations(SBAR). Information from the following report(s) SBAR, OR Summary, Procedure Summary, Intake/Output and Cardiac Rhythm NSR-ST was reviewed with the receiving nurse. Lines:   Peripheral IV 11/25/19 Left Hand (Active)   Site Assessment Clean, dry, & intact 11/25/2019  6:44 PM   Phlebitis Assessment 0 11/25/2019  6:44 PM   Infiltration Assessment 0 11/25/2019  6:44 PM   Dressing Status Clean, dry, & intact 11/25/2019  6:44 PM   Dressing Type Transparent;Tape 11/25/2019  6:44 PM   Hub Color/Line Status Pink; Infusing 11/25/2019  6:44 PM   Alcohol Cap Used Yes 11/25/2019  6:44 PM        Opportunity for questions and clarification was provided.       Patient transported with:   O2 @ 2 liters  Registered Nurse

## 2019-11-27 NOTE — PROGRESS NOTES
Dr. Talia Pierre at bedside to evaluation patient and determine discharge needs. Per Dr. Talia Pierre, pt stable to return home. IV removed. Pt given supplies like abd pads and chucks. Pt is going home WITH BASIL drains. She has a follow up appointment on 11/29; drains will be removed then. Discharge instructions reviewed with patient including BASIL drain care. Prescriptions given to patient. Esign utilized. Paperwork reviewed with patient and her mother. No question or concerns at this time. Pt will be discharged off unit via wheelchair to home.

## 2021-06-26 ENCOUNTER — HOSPITAL ENCOUNTER (INPATIENT)
Age: 36
LOS: 4 days | Discharge: HOME | DRG: 881 | End: 2021-07-01
Attending: EMERGENCY MEDICINE | Admitting: PSYCHIATRY & NEUROLOGY
Payer: COMMERCIAL
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
## 2021-06-26 LAB
ALBUMIN SERPL-MCNC: 4.4 G/DL (ref 3.5–5)
ALBUMIN/GLOB SERPL: 1.2 {RATIO} (ref 1.1–2.2)
ALP SERPL-CCNC: 69 U/L (ref 45–117)
ALT SERPL-CCNC: 24 U/L (ref 12–78)
AMPHET UR QL SCN: POSITIVE
ANION GAP SERPL CALC-SCNC: 8 MMOL/L (ref 5–15)
APPEARANCE UR: ABNORMAL
AST SERPL W P-5'-P-CCNC: 17 U/L (ref 15–37)
BACTERIA URNS QL MICRO: NEGATIVE /HPF
BARBITURATES UR QL SCN: NEGATIVE
BASOPHILS # BLD: 0 K/UL (ref 0–0.1)
BASOPHILS NFR BLD: 0 % (ref 0–1)
BENZODIAZ UR QL: POSITIVE
BILIRUB SERPL-MCNC: 0.4 MG/DL (ref 0.2–1)
BILIRUB UR QL: NEGATIVE
BUN SERPL-MCNC: 8 MG/DL (ref 6–20)
BUN/CREAT SERPL: 12 (ref 12–20)
CA-I BLD-MCNC: 9.5 MG/DL (ref 8.5–10.1)
CANNABINOIDS UR QL SCN: NEGATIVE
CHLORIDE SERPL-SCNC: 105 MMOL/L (ref 97–108)
CO2 SERPL-SCNC: 26 MMOL/L (ref 21–32)
COCAINE UR QL SCN: NEGATIVE
COLOR UR: ABNORMAL
COVID-19 RAPID TEST, COVR: NOT DETECTED
CREAT SERPL-MCNC: 0.68 MG/DL (ref 0.55–1.02)
DIFFERENTIAL METHOD BLD: ABNORMAL
DRUG SCRN COMMENT,DRGCM: ABNORMAL
EOSINOPHIL # BLD: 0 K/UL (ref 0–0.4)
EOSINOPHIL NFR BLD: 0 % (ref 0–7)
ERYTHROCYTE [DISTWIDTH] IN BLOOD BY AUTOMATED COUNT: 14 % (ref 11.5–14.5)
GLOBULIN SER CALC-MCNC: 3.6 G/DL (ref 2–4)
GLUCOSE SERPL-MCNC: 98 MG/DL (ref 65–100)
GLUCOSE UR STRIP.AUTO-MCNC: NEGATIVE MG/DL
HCG SERPL QL: NEGATIVE
HCT VFR BLD AUTO: 44.6 % (ref 35–47)
HGB BLD-MCNC: 14.1 G/DL (ref 11.5–16)
HGB UR QL STRIP: NEGATIVE
IMM GRANULOCYTES # BLD AUTO: 0 K/UL (ref 0–0.04)
IMM GRANULOCYTES NFR BLD AUTO: 0 % (ref 0–0.5)
KETONES UR QL STRIP.AUTO: 80 MG/DL
LEUKOCYTE ESTERASE UR QL STRIP.AUTO: NEGATIVE
LYMPHOCYTES # BLD: 1.7 K/UL (ref 0.8–3.5)
LYMPHOCYTES NFR BLD: 19 % (ref 12–49)
MCH RBC QN AUTO: 27.3 PG (ref 26–34)
MCHC RBC AUTO-ENTMCNC: 31.6 G/DL (ref 30–36.5)
MCV RBC AUTO: 86.4 FL (ref 80–99)
METHADONE UR QL: NEGATIVE
MONOCYTES # BLD: 0.6 K/UL (ref 0–1)
MONOCYTES NFR BLD: 6 % (ref 5–13)
MUCOUS THREADS URNS QL MICRO: ABNORMAL /LPF
NEUTS SEG # BLD: 6.8 K/UL (ref 1.8–8)
NEUTS SEG NFR BLD: 75 % (ref 32–75)
NITRITE UR QL STRIP.AUTO: NEGATIVE
NRBC # BLD: 0 K/UL (ref 0–0.01)
NRBC BLD-RTO: 0 PER 100 WBC
OPIATES UR QL: NEGATIVE
PCP UR QL: NEGATIVE
PH UR STRIP: 5 [PH] (ref 5–8)
PLATELET # BLD AUTO: 423 K/UL (ref 150–400)
PMV BLD AUTO: 10.5 FL (ref 8.9–12.9)
POTASSIUM SERPL-SCNC: 3.6 MMOL/L (ref 3.5–5.1)
PROT SERPL-MCNC: 8 G/DL (ref 6.4–8.2)
PROT UR STRIP-MCNC: NEGATIVE MG/DL
RBC # BLD AUTO: 5.16 M/UL (ref 3.8–5.2)
RBC #/AREA URNS HPF: ABNORMAL /HPF (ref 0–5)
SARS-COV-2, COV2: NORMAL
SODIUM SERPL-SCNC: 139 MMOL/L (ref 136–145)
SP GR UR REFRACTOMETRY: 1.02 (ref 1–1.03)
SPECIMEN SOURCE: NORMAL
UA: UC IF INDICATED,UAUC: ABNORMAL
UROBILINOGEN UR QL STRIP.AUTO: 2 EU/DL (ref 0.1–1)
WBC # BLD AUTO: 9.2 K/UL (ref 3.6–11)
WBC URNS QL MICRO: ABNORMAL /HPF (ref 0–4)

## 2021-06-26 PROCEDURE — 81001 URINALYSIS AUTO W/SCOPE: CPT

## 2021-06-26 PROCEDURE — 80053 COMPREHEN METABOLIC PANEL: CPT

## 2021-06-26 PROCEDURE — 99285 EMERGENCY DEPT VISIT HI MDM: CPT

## 2021-06-26 PROCEDURE — 85025 COMPLETE CBC W/AUTO DIFF WBC: CPT

## 2021-06-26 PROCEDURE — 87635 SARS-COV-2 COVID-19 AMP PRB: CPT

## 2021-06-26 PROCEDURE — 80307 DRUG TEST PRSMV CHEM ANLYZR: CPT

## 2021-06-26 PROCEDURE — 84703 CHORIONIC GONADOTROPIN ASSAY: CPT

## 2021-06-26 PROCEDURE — 36415 COLL VENOUS BLD VENIPUNCTURE: CPT

## 2021-06-26 RX ORDER — SERTRALINE HYDROCHLORIDE 50 MG/1
TABLET, FILM COATED ORAL
COMMUNITY
Start: 2021-04-08 | End: 2021-07-01

## 2021-06-26 RX ORDER — BUPROPION HYDROCHLORIDE 150 MG/1
TABLET ORAL
COMMUNITY
Start: 2021-04-08 | End: 2021-07-01

## 2021-06-26 RX ORDER — ALPRAZOLAM 0.25 MG/1
0.25 TABLET ORAL
COMMUNITY

## 2021-06-26 NOTE — ED TRIAGE NOTES
Pt to ed with family. Tearful. States  left her after 14 yrs. Mom was called and notified of pts intent to shoot herself. Pt does have access to a gun.

## 2021-06-26 NOTE — ED NOTES
6:25 PM    Patient to room at [phone]. Patient cooperative, tearful. Patient changed into green gown, 1 bag of belongings obtained to include a pair of sandals, pajama pants, t-shirt, and bra obtained. Mother and sister at bedside. Mother to take patient's purse with her. Patient provided water, warm blankets. Patient refusing food. Did attempt to drink water. Room locked for safety, in green gown. Fifi with behavorial health intake notified of patient's arrival to room. Family remains at bedside. Will cont to monitor.      7:28 PM    Report given to Rahul Bustos

## 2021-06-27 PROBLEM — F32.9 MAJOR DEPRESSION: Status: ACTIVE | Noted: 2021-06-27

## 2021-06-27 PROCEDURE — 65220000003 HC RM SEMIPRIVATE PSYCH

## 2021-06-27 PROCEDURE — 74011250637 HC RX REV CODE- 250/637: Performed by: PSYCHIATRY & NEUROLOGY

## 2021-06-27 RX ORDER — BUPROPION HYDROCHLORIDE 150 MG/1
150 TABLET ORAL
Status: DISCONTINUED | OUTPATIENT
Start: 2021-06-27 | End: 2021-06-27 | Stop reason: SDUPTHER

## 2021-06-27 RX ORDER — ALPRAZOLAM 0.25 MG/1
0.25 TABLET ORAL
Status: DISCONTINUED | OUTPATIENT
Start: 2021-06-27 | End: 2021-07-01 | Stop reason: HOSPADM

## 2021-06-27 RX ORDER — BUPROPION HYDROCHLORIDE 150 MG/1
150 TABLET ORAL DAILY
Status: DISCONTINUED | OUTPATIENT
Start: 2021-06-27 | End: 2021-07-01 | Stop reason: HOSPADM

## 2021-06-27 RX ORDER — ACETAMINOPHEN 325 MG/1
650 TABLET ORAL
Status: DISCONTINUED | OUTPATIENT
Start: 2021-06-27 | End: 2021-07-01 | Stop reason: HOSPADM

## 2021-06-27 RX ORDER — MAG HYDROX/ALUMINUM HYD/SIMETH 200-200-20
30 SUSPENSION, ORAL (FINAL DOSE FORM) ORAL
Status: DISCONTINUED | OUTPATIENT
Start: 2021-06-27 | End: 2021-07-01 | Stop reason: HOSPADM

## 2021-06-27 RX ORDER — SERTRALINE HYDROCHLORIDE 50 MG/1
50 TABLET, FILM COATED ORAL DAILY
Status: DISCONTINUED | OUTPATIENT
Start: 2021-06-27 | End: 2021-07-01 | Stop reason: HOSPADM

## 2021-06-27 RX ORDER — TRAZODONE HYDROCHLORIDE 50 MG/1
50 TABLET ORAL
Status: DISCONTINUED | OUTPATIENT
Start: 2021-06-27 | End: 2021-07-01 | Stop reason: HOSPADM

## 2021-06-27 RX ORDER — ALPRAZOLAM 0.25 MG/1
0.25 TABLET ORAL
Status: DISCONTINUED | OUTPATIENT
Start: 2021-06-27 | End: 2021-06-27

## 2021-06-27 RX ORDER — HYDROXYZINE 50 MG/1
50 TABLET, FILM COATED ORAL
Status: DISCONTINUED | OUTPATIENT
Start: 2021-06-27 | End: 2021-07-01 | Stop reason: HOSPADM

## 2021-06-27 RX ADMIN — HYDROXYZINE HYDROCHLORIDE 50 MG: 50 TABLET, FILM COATED ORAL at 08:52

## 2021-06-27 RX ADMIN — SERTRALINE HYDROCHLORIDE 50 MG: 50 TABLET ORAL at 08:52

## 2021-06-27 RX ADMIN — BUPROPION HYDROCHLORIDE 150 MG: 150 TABLET, FILM COATED, EXTENDED RELEASE ORAL at 08:52

## 2021-06-27 NOTE — ED PROVIDER NOTES
EMERGENCY DEPARTMENT HISTORY AND PHYSICAL EXAM      Date: 6/26/2021  Patient Name: Russ Collins    History of Presenting Illness     Chief Complaint   Patient presents with   [address] Problem       History Provided By: Patient    HPI: Russ Collins, 29 y.o. female with a past medical history significant psych presents to the ED with chief complaint of Mental Health Problem  . 36-year-old female was notified that her  intends to leave her. She became very tearful. Made the statement that she wanted to shoot herself and does have access to a gun. She is tearful now. No physical complaints. ECO          There are no other complaints, changes, or physical findings at this time. PCP: Shay Sigala MD    Current Outpatient Medications   Medication Sig Dispense Refill    ALPRAZolam (XANAX) 0.25 mg tablet Take 0.25 mg by mouth daily as needed for Anxiety.       buPROPion XL (WELLBUTRIN XL) 150 mg tablet take 1 tablet by mouth every 24 hours      sertraline (ZOLOFT) 50 mg tablet take 1 tablet by mouth once daily         Past History     Past Medical History:  Past Medical History:   Diagnosis Date    Anxiety     Constipation        Past Surgical History:  Past Surgical History:   Procedure Laterality Date    HX ABDOMINAL LAPAROSCOPY N/A 2009    HX BREAST AUGMENTATION Bilateral 11/25/2019    BILATERAL BREAST LIFT WITH AUGMENTATION WITH FAT GRAFTING; ABDOMINOPLASTY performed by Henna Persaud MD at [address] HX GASTRIC BYPASS N/A 2017    HX OTHER SURGICAL Bilateral 01/2019    Brachioplasty    HX TENDON / LIGAMENT TRANSPLANT Left 2016    Foot- with hardware    HX TONSILLECTOMY         Family History:  Family History   Problem Relation Age of Onset    No Known Problems Mother     High Cholesterol Father     Diabetes Father     Hypertension Father     Malignant Hyperthermia Neg Hx        Social History:  Social History     Tobacco Use    Smoking status: Former Smoker Packs/day: 1.00     Years: 12.00     Pack years: 12.00     Types: Cigarettes     Quit date: 2016     Years since quittin.5    Smokeless tobacco: Never Used   Substance Use Topics    Alcohol use: Yes     Comment: occ    Drug use: Never       Allergies: Allergies   Allergen Reactions    Ayala Other (comments)     Mouth sores         Review of Systems   Review of Systems   Constitutional: Negative. Negative for chills, fatigue and fever. HENT: Negative. Negative for congestion, nosebleeds and sore throat. Eyes: Negative. Negative for pain, discharge and visual disturbance. Respiratory: Negative. Negative for cough, chest tightness and shortness of breath. Cardiovascular: Negative for chest pain, palpitations and leg swelling. Gastrointestinal: Negative for abdominal pain, blood in stool, constipation, diarrhea, nausea and vomiting. Endocrine: Negative. Genitourinary: Negative. Negative for difficulty urinating, dysuria, pelvic pain and vaginal bleeding. Musculoskeletal: Negative. Negative for arthralgias, back pain and myalgias. Skin: Negative. Negative for rash and wound. Allergic/Immunologic: Negative. Neurological: Negative. Negative for dizziness, syncope, weakness, numbness and headaches. Hematological: Negative. Psychiatric/Behavioral: Positive for suicidal ideas. Negative for agitation and confusion. All other systems reviewed and are negative. Physical Exam   Physical Exam  Vitals and nursing note reviewed. Exam conducted with a chaperone present. Constitutional:       Appearance: Normal appearance. She is normal weight. HENT:      Head: Normocephalic and atraumatic. Nose: Nose normal.      Mouth/Throat:      Mouth: Mucous membranes are moist.      Pharynx: Oropharynx is clear. Eyes:      Extraocular Movements: Extraocular movements intact.       Conjunctiva/sclera: Conjunctivae normal.      Pupils: Pupils are equal, round, and reactive to light. Cardiovascular:      Rate and Rhythm: Normal rate and regular rhythm. Pulses: Normal pulses. Heart sounds: Normal heart sounds. Pulmonary:      Effort: Pulmonary effort is normal. No respiratory distress. Breath sounds: Normal breath sounds. Abdominal:      General: Abdomen is flat. Bowel sounds are normal. There is no distension. Palpations: Abdomen is soft. Tenderness: There is no abdominal tenderness. There is no guarding. Musculoskeletal:         General: No swelling, tenderness, deformity or signs of injury. Normal range of motion. Cervical back: Normal range of motion and neck supple. Right lower leg: No edema. Left lower leg: No edema. Skin:     General: Skin is warm and dry. Capillary Refill: Capillary refill takes less than 2 seconds. Findings: No lesion or rash. Neurological:      General: No focal deficit present. Mental Status: She is alert and oriented to person, place, and time. Mental status is at baseline. Cranial Nerves: No cranial nerve deficit. Psychiatric:         Mood and Affect: Mood normal.         Behavior: Behavior normal.         Thought Content: Thought content normal.         Judgment: Judgment normal.         Diagnostic Study Results     Labs -     Recent Results (from the past 12 hour(s))   CBC WITH AUTOMATED DIFF    Collection Time: 06/26/21  5:40 PM   Result Value Ref Range    WBC 9.2 3.6 - 11.0 K/uL    RBC 5.16 3.80 - 5.20 M/uL    HGB 14.1 11.5 - 16.0 g/dL    HCT 44.6 35.0 - 47.0 %    MCV 86.4 80.0 - 99.0 FL    MCH 27.3 26.0 - 34.0 PG    MCHC 31.6 30.0 - 36.5 g/dL    RDW 14.0 11.5 - 14.5 %    PLATELET 306 (H) 302 - 400 K/uL    MPV 10.5 8.9 - 12.9 FL    NRBC 0.0 0.0  WBC    ABSOLUTE NRBC 0.00 0.00 - 0.01 K/uL    NEUTROPHILS 75 32 - 75 %    LYMPHOCYTES 19 12 - 49 %    MONOCYTES 6 5 - 13 %    EOSINOPHILS 0 0 - 7 %    BASOPHILS 0 0 - 1 %    IMMATURE GRANULOCYTES 0 0 - 0.5 %    ABS.  NEUTROPHILS 6.8 1.8 - 8.0 K/UL    ABS. LYMPHOCYTES 1.7 0.8 - 3.5 K/UL    ABS. MONOCYTES 0.6 0.0 - 1.0 K/UL    ABS. EOSINOPHILS 0.0 0.0 - 0.4 K/UL    ABS. BASOPHILS 0.0 0.0 - 0.1 K/UL    ABS. IMM. GRANS. 0.0 0.00 - 0.04 K/UL    DF AUTOMATED     METABOLIC PANEL, COMPREHENSIVE    Collection Time: 06/26/21  5:40 PM   Result Value Ref Range    Sodium 139 136 - 145 mmol/L    Potassium 3.6 3.5 - 5.1 mmol/L    Chloride 105 97 - 108 mmol/L    CO2 26 21 - 32 mmol/L    Anion gap 8 5 - 15 mmol/L    Glucose 98 65 - 100 mg/dL    BUN 8 6 - 20 mg/dL    Creatinine 0.68 0.55 - 1.02 mg/dL    BUN/Creatinine ratio 12 12 - 20      GFR est AA >60 >60 ml/min/1.73m2    GFR est non-AA >60 >60 ml/min/1.73m2    Calcium 9.5 8.5 - 10.1 mg/dL    Bilirubin, total 0.4 0.2 - 1.0 mg/dL    AST (SGOT) 17 15 - 37 U/L    ALT (SGPT) 24 12 - 78 U/L    Alk.  phosphatase 69 45 - 117 U/L    Protein, total 8.0 6.4 - 8.2 g/dL    Albumin 4.4 3.5 - 5.0 g/dL    Globulin 3.6 2.0 - 4.0 g/dL    A-G Ratio 1.2 1.1 - 2.2     URINALYSIS W/ REFLEX CULTURE    Collection Time: 06/26/21  5:40 PM    Specimen: Urine   Result Value Ref Range    Color Yellow/Straw      Appearance Turbid (A) Clear      Specific gravity 1.023 1.003 - 1.030      pH (UA) 5.0 5.0 - 8.0      Protein Negative Negative mg/dL    Glucose Negative Negative mg/dL    Ketone 80 (A) Negative mg/dL    Bilirubin Negative Negative      Blood Negative Negative      Urobilinogen 2.0 (H) 0.1 - 1.0 EU/dL    Nitrites Negative Negative      Leukocyte Esterase Negative Negative      UA:UC IF INDICATED Culture not indicated by UA result Culture not indicated by UA result      WBC 0-4 0 - 4 /hpf    RBC 0-5 0 - 5 /hpf    Bacteria Negative Negative /hpf    Mucus 3+ /lpf   DRUG SCREEN, URINE    Collection Time: 06/26/21  5:40 PM   Result Value Ref Range    AMPHETAMINES Positive (A) Negative      BARBITURATES Negative Negative      BENZODIAZEPINES Positive (A) Negative      COCAINE Negative Negative      METHADONE Negative Negative      OPIATES Negative Negative      PCP(PHENCYCLIDINE) Negative Negative      THC (TH-CANNABINOL) Negative Negative      Drug screen comment        This test is a screen for drugs of abuse in a medical setting only (i.e., they are unconfirmed results and as such must not be used for non-medical purposes, e.g.,employment testing, legal testing). Due to its inherent nature, false positive (FP) and false negative (FN) results may be obtained. Therefore, if necessary for medical care, recommend confirmation of positive findings by GC/MS. HCG QL SERUM    Collection Time: 06/26/21  5:40 PM   Result Value Ref Range    HCG, Ql. Negative Negative         Radiologic Studies -   No orders to display     CT Results  (Last 48 hours)    None        CXR Results  (Last 48 hours)    None          Medical Decision Making and ED Course   I am the first provider for this patient. I reviewed the vital signs, available nursing notes, past medical history, past surgical history, family history and social history. Vital Signs-Reviewed the patient's vital signs. Patient Vitals for the past 12 hrs:   Temp Pulse Resp BP SpO2   06/26/21 1728 98.1 °F (36.7 °C) 90 16 129/79 99 %       EKG interpretation:         Records Reviewed: Previous Hospital chart. EMS run report      ED Course:   Initial assessment performed. The patients presenting problems have been discussed, and they are in agreement with the care plan formulated and outlined with them. I have encouraged them to ask questions as they arise throughout their visit.     Orders Placed This Encounter    CBC WITH AUTOMATED DIFF     Standing Status:   Standing     Number of Occurrences:   1    METABOLIC PANEL, COMPREHENSIVE     Standing Status:   Standing     Number of Occurrences:   1    URINALYSIS W/ REFLEX CULTURE     Standing Status:   Standing     Number of Occurrences:   1    DRUG SCREEN, URINE     Standing Status:   Standing     Number of Occurrences:   1    HCG QL SERUM     Standing Status:   Standing     Number of Occurrences:   1    SUICIDE PRECAUTIONS     Standing Status:   Standing     Number of Occurrences:   1              CONSULTANTS:  Consults  Behavioral health psychiatry evaluated the patient at bedside. Discussed their plan of care with the on-call psychiatrist.      Provider Notes (Medical Decision Making):   81-year-old female presents with a suicide statement after her  threatening to leave her. Does have access to a gun as she plan to shoot herself. Patient presents in emergency custody order. Awaiting [place] [id number] to evaluate. 8:39 PM  Medically clear      Procedures                       Disposition       Emergency Department Disposition:  Behavioral Health Hold      Diagnosis     Clinical Impression:   1. Suicide attempt by inadequate means, initial encounter [facility])        Attestations:    Swapna Bahena MD    Please note that this dictation was completed with [employer], the computer voice recognition software. Quite often unanticipated grammatical, syntax, homophones, and other interpretive errors are inadvertently transcribed by the computer software. Please disregard these errors. Please excuse any errors that have escaped final proofreading. Thank you.

## 2021-06-27 NOTE — BH NOTES
Valente Thakkar is a 29years old female, voluntary admit, under Dr. Guerrero [facility] services with diagnosis: Major Depression with suicide ideation. Patient was brought to the ED by her mother and sister-law after suicide attempt, to shoot herself with a gun, for evaluation and treatment. Patient is having marital problem. Patient stated that she have been  for 14 years. She has been experiencing increase depression, anxiety, decrease appetite, decrease sleep and having racing thought the last two months. She denies AH & VH, She has no history or family history of psychiatric problem. She is not seeing a Psychiatrist. She has no medical problem. and  NKDA  She is not a tobacco smoker. She does not use  alcohol or illegal drugs. She was cooperative and pleasant during the assessment but tearful.

## 2021-06-27 NOTE — BH NOTES
PSA PART II ADDITIONAL INFORMATION        Access To Fire Arms: Yes: Comment: Pts  has firearm, she reports it has been removed from the home    Substance Use: NO    Last Use: n/a    Type of Substance: no substance use    Frequency of Use: n/a    Request to See : NO    If yes, notified : NO    Release of Information Signed: YES    Release of Information Signed For: Praveena Quinonez (best friend)-[phone] and roz (mother)-[phone]    Treatment Plan: pt reviewed and signed treatment plan

## 2021-06-27 NOTE — GROUP NOTE
[facility]  GROUP DOCUMENTATION INDIVIDUAL                                                                          Group Therapy Note    Date: 6/27/2021    Group Start Time: 1300  Group End Time: 9423  Group Topic: Process Group - Inpatient    SRM 2 [facility] ACUTE    Giselle Boyer    IP [address] [facility] GROUP DOCUMENTATION GROUP    Group Therapy Note    Attendees: 9/12    Process: pts were asked what they are proud of themselves for as a check in question. Pts were then encouraged to share their thoughts, emotions and experiences that led them to the hospital. Pts were encouraged to provide feedback to one another. Pts were then asked to reflect on group         Attendance: Attended    Patient's Goal:  Pt shared that she is proud of herself for being a good mom    Interventions/techniques: Validated, Promoted peer support, Provide feedback and Supported    Follows Directions: Followed directions    Interactions: Interacted appropriately    Mental Status: Calm, Congruent, Depressed and Flat    Behavior/appearance: Attentive, Neatly groomed and Withdrawn/quiet    Goals Achieved: Able to engage in interactions and Able to listen to others      Additional Notes:  Pt was quiet throughout most of group but was listening to peers. At the end of group the pt asked writer about leaving as her therapist told her that she would only need to be here two days. Writer explained average length of stay is 5-7 days and that her  would discuss options with her in a private setting.  Pt is making some progress towards goals by attending and participating in group    [facility]

## 2021-06-27 NOTE — BH NOTES
PSYCHOSOCIAL ASSESSMENT  :Patient identifying info:   Lyla Yancey is a 29 y.o., female admitted 2021  5:24 PM     Presenting problem and precipitating factors:   Pt voluntarily admitted to the ED after attempting to shoot herself in the head. The pt reported that her  of 14 years said he was going to visit a cousin and she found out that he was visiting his girlfriend in [place]. The pt said that this was overwhelming and upset her greatly. Pt said it was 'an impulsive and bad decision' and that her therapist told her to come here for two days. Writer explained that average length of stay is 5-7 days     Mental status assessment:   Pt presented with a flat affect and anxious mood. Pt was polite, cooperative and maintained good eye contact. Pt is oriented x3 and has little insight into suicide attempt. Pt reported that being here 'just makes it worse' and that she 'made a mistake' coming to the hospital  Strengths:  Pt reported that she is a hard worker and good mother/wife  Collateral information:   Pt signed a release for: Missael Rodriguez (best friend)-[phone] and roz (mother)-[phone]  Current psychiatric /substance abuse providers and contact info:   Pt sees Yolis Murray as a therapist  Previous psychiatric/substance abuse providers and response to treatment:    This is pts first hospitalization   Family history of mental illness or substance abuse:   Pt denied this   Substance abuse history:    Social History     Tobacco Use    Smoking status: Former Smoker     Packs/day: 1.00     Years: 12.00     Pack years: 12.00     Types: Cigarettes     Quit date: 2016     Years since quittin.5    Smokeless tobacco: Never Used   Substance Use Topics    Alcohol use: Yes     Comment: occ   n/a    History of biomedical complications associated with substance abuse :  n/a  Patient's current acceptance of treatment or motivation for change:  Pt presents with little insight into suicide attempt and poor judgement. Pt does not believe she needs to be in the hospital for mental health treatment   Family constellation:   Pt said she has a sister, her mother and her sister in law as well as her step daughter (16) who she has raised since she was a baby  Is significant other involved? Pt is unsure of status of her relationship with her  of 14 years as he is having an affair    Describe support system:   Pt said that she has 'loads of support' and that her best friend is a .  Pt also said she is close with her family and friends   Describe living arrangements and home environment:  Pt lives with her  and daughter   Health issues:   Hospital Problems  Never Reviewed        Codes Class Noted POA    Major depression ICD-10-CM: F32.9  ICD-9-CM: 296.20  2021 Unknown            n/a  Trauma history:   Pt denied this   Legal issues:   Pt denied this   History of  service:   Pt denied this   Financial status:   Pt currently works at [employer]   Muslim/cultural factors:   Pt is Yazidism but declined to see luis  Education/work history:   Pt completed some college and studied history  Have you been licensed as a health care professional (current or ):   n/a  Leisure and recreation preferences:   Pt said hanging out with friends and watching tv   Describe coping skills:  Pt said she likes being around people and breathing exercise  [employer]  2021

## 2021-06-27 NOTE — BSMART NOTE
[facility] INTAKE ASSESSMENT     Pt assessed face to face in ED #20. Pt reportedly brought to the ED my sister-in-law Soren and mother Maximus Nava @ [phone]. Patient presents with SI, as evidenced by \"I tried to shoot myself but the gun didn't work\". Pt reports the incident came about secondary to \"my  of 14 years left me\". Pt observed/presented covered under hospital provided blankets. Pt was calm, cooperative, and sometimes tearful. Pt's speech was clear, quiet, soft, affect sad. Pt's thought process was logical. Pt denies HI and A/VH. Pt presents w/ some insight, poor/impaired judgment. Pt reports she has experienced increasing depression the past month and has participated in marriage counseling the past two months. Pt reports a sleep pattern of two (2) hours daily, and an appetite of one (1) meal per day. Pt endorses racing thoughts and anxiety. Pt reports having had access to a gun, however sister-in-law Doty @ [phone] removed the gun from the home. Other guns are reportedly in the home, but they are \"under lock and key where I can't get to it\". Pt answered \"No\" to substance use, however patients labs confirm a positive result for Benzodiazepines and Amphetamines. Pt reports a hx of depression, but denies a hx of previous behavioral health hospitalizations. Pt denies an assigned psychiatrist in the community, however Marriage Counselor Fadumo Davis advised sister-in-law Soren to bring pt to the hospital. Pt rates her depression as 10/10 and anxiety 9/10. Pt reports a hx of anxiety on the maternal side of the family, denies legal issues and denies a medical hx of hypertension, cholesterol, and diabetes. Pt further denies a hx of sexual, physical, or verbal trauma. Pt reports her support system as mother and sister-in-law. Pt tearful when asked about housing and stated \"I thought I lived with my \". Pt reports allergies to \"gio\" and on medications Zoloft and Wellbutrin. Antidepressants reportedly \"keeps me from having panic attacks\". DISPOSITION: Writer consulted/presented case to Dr. Maria R Acosta who recommends Inpatient Level of Care/admission. Pt admitted under the dx of Major Depression. Pt agreed to a voluntary admission. Writer explained the difference between a voluntary admission versus a potential TDO should pt change her mind. Pt confirmed an understanding of the process. Writer advised pt, assigned ED nurse Patricia/assigned Jason, and [place] Back/Anthony of the same. Writer provided pt overview to nurse [place] pending Report from assigned ED nurse. Contact extensions exchanged between parties. Pt to be medically cleared (meeting [facility] requirements), in addition to the clearance of a negative COVID-19  PCR result. Pt to be assigned per [place] to 231/1.

## 2021-06-27 NOTE — GROUP NOTE
[facility] GROUP DOCUMENTATION INDIVIDUAL                                                                          Group Therapy Note    Date: 6/27/2021    Group Start Time: 1100  Group End Time: 1200  Group Topic: Education Group - Inpatient    SRM 2  NON ACUTE    Gregory Ruff    IP [address] [facility] GROUP DOCUMENTATION GROUP    Group Therapy Note    Attendees: 9/12  Facilitated structured group about S.M.A.R.T. Goals. Defined components of S.M.A.R.T. Goals with pt and encouraged pt to identify goals. Attendance: Attended    Patient's Goal:  STG: Attends activities and groups      Interventions/techniques: Informed, Provide feedback and Supported    Follows Directions: Followed directions    Interactions: Interacted appropriately    Mental Status: Calm and Flat    Behavior/appearance: Attentive and Cooperative    Goals Achieved: Able to engage in interactions and Able to listen to others    Additional Notes: Pt attended group and actively participated in group discussion. Pt received materials provided in group and with encouragement and prompts was able to verbalized personal goals. Pt shared goals in group and was able to accept feedback from peers related to group discussion. Pt verbalized goals to work on new future without her  and develop how to accomplish this.       Mitra Hernandez, NOEMIS

## 2021-06-27 NOTE — BH NOTES
[facility] Recreational Therapy Assessment    Orientation:  Person, Place, Date and Situation    Reason for Admission: Pt reports she was admitted because \"my family and therapist recommended it because I tried to kill myself\". Pt reports putting a gun to my head and pulling the trigger and it went off and the bullet went in front of me . Reports the trigger as \"I found out yesterday that my  of 14 years was with another woman. Everything compounded and I did not know what to do \".      Medical Precautions / Conditions:  h/o gastric bypass    Impairments:     Vision:  Wears Glasses Yes      Wears Contacts Yes      Are they with Patient Yes     Hearing Aids No     Utilization of Ambulatory Devices:  None    Health Problems Preventing Participation in Activities:  No  How:  n/a  Leisure Interest Checklist:  Art, Hakeem, [address] [place] [address], Isai / Board Games, [employer], [facility], Crafts, Exercising, [employer], Fishing, Gardening, [employer], Playing an [email or web address], Reading, Sewing, Singing, Sports, TV / [employer] Pauline, Visiting with Others, Texting, Video Games, Volunteer Work and Walking    Does patient participate in leisure activities:  Sometimes    Setting:  With Family and With Friends    Other Activities / Skills / Talents: N/A    Do emotions interfere with leisure activities / lifestyles:  No    When engaging in leisure activities, do you forget worries:  Yes    Do you belong to a Sikhism:  No    Are you active in [employer] activities:  No    Typical Day: Spends time at home taking care of family and works full-time at [employer].     Strengths:  Verbal Expression, Family Support, Social Support, Employment, Housing and I am very kind and organized    Limitations / Barriers:  Anxiety and  Relationship stressors    Treatment Modalities:  Stress Management, Coping Skills, Symptom Recognition, Healthy Thinking, Mood Management and Leisure Skills    Patient Educational  Needs:  Skills to recognize and challenge problematic thinking, Identify positive coping strategies and skills to manage symptoms or moods, Leisure education and Recognition of symptoms and signs    Focus of Treatment:  Introduce positive outlets for self expression and Introduce and encourage the exploration of alternative coping skills    Summary: Pt has been living with  in [place] [id number]. Plans to have friend or family stay with her and not be alone for a while  Goal for hospitalization is to learn to be myself without .

## 2021-06-28 PROCEDURE — 65220000003 HC RM SEMIPRIVATE PSYCH

## 2021-06-28 PROCEDURE — 74011250637 HC RX REV CODE- 250/637: Performed by: PSYCHIATRY & NEUROLOGY

## 2021-06-28 RX ADMIN — SERTRALINE HYDROCHLORIDE 50 MG: 50 TABLET ORAL at 08:38

## 2021-06-28 RX ADMIN — BUPROPION HYDROCHLORIDE 150 MG: 150 TABLET, FILM COATED, EXTENDED RELEASE ORAL at 08:38

## 2021-06-28 NOTE — GROUP NOTE
[facility] GROUP DOCUMENTATION INDIVIDUAL                                                                          Group Therapy Note    Date: 6/28/2021    Group Start Time: 8460  Group End Time: 9818  Group Topic: Nursing    SRM 2 BEHA TH ACUTE    Rosey Hernandez LPN    [facility] GROUP DOCUMENTATION GROUP    Group Therapy Note    Attendees: 10/14         Attendance: Did not attend    Patient's Goal:  ID stress symptoms    Interventions/techniques: Informed    Follows Directions:  Followed directions    Interactions: Interacted appropriately    Mental Status: Calm    Behavior/appearance: Attentive    Goals Achieved: Able to listen to others      Additional Notes:  Patient was receptive to the information discussed    Jemima Regan LPN

## 2021-06-28 NOTE — BH NOTES
D19 Disposition     Pt spoke with D19 worker Raymond Lee. Raymond Lee told pt that it is recommended that the pt stay for treatment. Pt said she would stay voluntarily at this time.  Pt was upset but understanding of this

## 2021-06-28 NOTE — PROGRESS NOTES
Progress Note  Date:2021       Room:[facility]  Patient Name:Jessica Robison     YOB: 1985     Age:36 y.o. Subjective    Subjective   Patient 30-year-old  female who is admitted to the behavioral to patient had reported to have put  gun to her head to self-harm. It has been reported that patient had pulled the trigger but did not hurt herself. Patient today was more focused on wanting to be discharged and feels she was told that she could stay for 2 days and could be discharged. Patient shared her stressors of her 15year-old marriage and her spouse leaving her for another woman. Patient emotional during her conversation relating to her marital situation. Patient was encouraged that she continue to address to process situations leading to her hospitalizations. Patient wanting to leave and wanting [place] [phone] to be contacted    Mental status examination-patient is alert and oriented x3 casually dressed emotional directable. Insight judgment and coping remains impaired currently denies any active plan of suicide or homicide with patient at the time of presentation presented suicidal gesture or attempt trying to shoot herself. Review of Systems  Objective         Vitals Last 24 Hours:  TEMPERATURE:  Temp  Av.1 °F (36.7 °C)  Min: 97.6 °F (36.4 °C)  Max: 98.5 °F (36.9 °C)  RESPIRATIONS RANGE: Resp  Av  Min: 18  Max: 18  PULSE OXIMETRY RANGE: SpO2  Av.5 %  Min: 99 %  Max: 100 %  PULSE RANGE: Pulse  Av  Min: 87  Max: 133  BLOOD PRESSURE RANGE: Systolic (44LIV), VOK:674 , Min:109 , HKN:690   ; Diastolic (96VBQ), LXP:76, Min:72, Max:79    I/O (24Hr):   No intake or output data in the 24 hours ending 21 1531  Objective  Labs/Imaging/Diagnostics    Labs:  CBC:  Recent Labs     21  1740   WBC 9.2   RBC 5.16   HGB 14.1   HCT 44.6   MCV 86.4   RDW 14.0   *     CHEMISTRIES:  Recent Labs     21  1740    K 3.6      CO2 26   BUN 8   CA 9.5   PT/INR:No results for input(s): INR, INREXT in the last 72 hours. No lab exists for component: PROTIME  APTT:No results for input(s): APTT in the last 72 hours. LIVER PROFILE:  Recent Labs     06/26/21  1740   AST 17   ALT 24     Lab Results   Component Value Date/Time    ALT (SGPT) 24 06/26/2021 05:40 PM    AST (SGOT) 17 06/26/2021 05:40 PM    Alk. phosphatase 69 06/26/2021 05:40 PM    Bilirubin, total 0.4 06/26/2021 05:40 PM       Imaging Last 24 Hours:  No results found. Assessment//Plan   Active Problems:    Major depression (6/27/2021)      Assessment & Plan  Spent time with patient processing stressors leading to her hospitalization and encouraging her to completing treatment and to process her current situation. Patient wants to be discharged and wants [place] [phone] to be contacted.   Informed staff  Reach out to family and a counselors for collateral      Current Facility-Administered Medications:     hydrOXYzine HCL (ATARAX) tablet 50 mg, 50 mg, Oral, TID PRN, Prerna TITUS MD, 50 mg at 06/27/21 0852    traZODone (DESYREL) tablet 50 mg, 50 mg, Oral, QHS PRN, Brandin Manuel MD    acetaminophen (TYLENOL) tablet 650 mg, 650 mg, Oral, Q4H PRN, Marvin Manuel MD    sertraline (ZOLOFT) tablet 50 mg, 50 mg, Oral, DAILY, Marvin Manuel MD, 50 mg at 06/28/21 0838    buPROPion XL (WELLBUTRIN XL) tablet 150 mg, 150 mg, Oral, DAILY, Marvin Manuel MD, 150 mg at 06/28/21 2859    ALPRAZolam (XANAX) tablet 0.25 mg, 0.25 mg, Oral, QID PRN, Brandin Manuel MD    alum-mag hydroxide-simeth (MYLANTA) oral suspension 30 mL, 30 mL, Oral, Q4H PRN, Laura Pyle MD  Electronically signed by Gaby Todd MD on 6/28/2021 at 3:31 PM

## 2021-06-28 NOTE — GROUP NOTE
IP  GROUP DOCUMENTATION INDIVIDUAL                                                                          Group Therapy Note    Date: 6/28/2021    Group Start Time: 1115  Group End Time: 1200  Group Topic: Education Group - Inpatient    SRM 2  NON ACUTE    Drake De León    IP [facility] GROUP DOCUMENTATION GROUP    Group Therapy Note    Facilitated discussion focus understanding and developing healthy boundaries to help improve relationships    Attendees: 11/14         Attendance: Attended    Patient's Goal:  Attend group daily    Interventions/techniques: Informed and Supported    Follows Directions:  Followed directions    Interactions: Interacted appropriately    Mental Status: Calm    Behavior/appearance: Cooperative    Goals Achieved: Able to engage in interactions and Able to listen to others      Additional Notes:  Pt was receptive to information discussed on healthy and unhealthy boundaries and was able to recognize some of that apply to her such as \"having limits and recognizing what they are and allowing others to hurt her and trust too easily\" Pt shared she need to set limits with those closest to her such as \"telling them no sometimes\"    Cheo Parikh

## 2021-06-28 NOTE — BH NOTES
Collateral     Writer spoke with pts mother, Jess Mayberry. Hermelinda said that she has suspected that things haven't been going well with her  for a little. Hemrelinda said that the pt has been working a lot and not eating/sleeping as well. Hermelinda said that the pt is usually 'always on the move' so this environment is difficult. Hermelinda confirmed that the weapons were removed from the home and that the pt is wanting to see her therapist weekly. Writer explained the D19 process and Hermelinda verbalized understanding. Hermelinda said she thinks that this 'incident' occurred due to 'everything hitting her at once' regarding her marriage and stress.

## 2021-06-28 NOTE — BH NOTES
Pt reported wanting to leave. Writer explained TDO prescreen process and pt verbalized understanding.  Writer called D19 and faxed clinicals to Fabián Snow per Dr Stevie Mejias

## 2021-06-28 NOTE — PROGRESS NOTES
Mirian Nunez actively participated in Spirituality Group about \"spirituality\" on 6.28.21 on the [facility] unit    Rosalba Kilpatrick M. Div Chaplain can be reached by calling the  [phone]

## 2021-06-28 NOTE — H&P
Initial psychiatric evaluation    Chief complaint  Depressed mood, suicidal ideations, she had a gun in the air    History of present illness  Sulema Pinto is a 28-year-old female who was brought to the emergency room by her mother and sister-in-law after suicide attempt to shoot herself with a gun. She reports that her  of 14 years ended relationship abruptly and told her to not to call her. She was upset and might have had a gun in the air or plan to shoot herself with a gun. She has been having marital problems. Patient stated that she has been  for 14 years. She also has been experiencing increasing depression anxiety decreased appetite decreased sleep and having racing thoughts in the last 2 months. She denies acute psychotic symptoms denies hearing voices or paranoid ideations. She reports that this was a unexpected situation for her and she could not handle herself well and she regrets what she has done at this point. She does not intend to harm herself at this point but notices that what she has done shooting a gun in the air and suicidal ideations prompted the hospitalization and she wants to get help. She has some supports. She denies acute psychotic symptoms acute manic symptoms no hearing voices or paranoid ideations. She denies active medical problems. She denies alcohol or substance abuse. At times she has been tearful depressed sad. Also admits to low energy and low motivation. Sleep is fair. She has been more anxious lately. Apparently her  said that she was visiting somebody in [place] but in fact he was visiting his girlfriend.     Past psychiatric history  No previous psychiatric hospitalizations  Has been on Xanax for anxiety and Wellbutrin for depression    Family psychiatric history  Denied    Past medical history  Denies active medical problems  No known drug allergies    Review of systems and physical examination  Please see medical H&P in chart    Social history  Denies alcohol or substance abuse  She has not tobacco consumer  She says she has a lot of support and her best friend is a   She also has other family and friends that she is close with  Says she has a sister and her mother and her sister-in-law all are supportive and she is a 12-year-old stepdaughter whom she has regressed since she was a baby  Currently works at [employer] and has stated some college specifically history    Mental status exam  Alert oriented cooperative  Speech is goal-directed soft tone  Mood is depressed, affect is labile  Does not appear to respond to internal stimuli  Insight and judgment poor to fair  Does not appear to respond to internal stimuli  No evidence of acute psychosis  Positive suicidal ideation on admission  Denies hearing voices    Diagnosis  Major depression  Anxiety disorder unspecified    Recommendations  Patient is appropriate for psychiatric hospitalization which is medically necessary  Her outpatient medications including Wellbutrin and Xanax are continued  Further adjustments of her medications may be needed in hospitalization  Discussed safety issues  Follow-up with psychiatric team again tomorrow  Her shooting gun in the air and and suicidal ideation with a plan to shoot herself is of concern for safety

## 2021-06-28 NOTE — BH NOTES
Behavioral Health Treatment Team Note     Patient goal(s) for today: 'keep going to groups'  Treatment team focus/goals: continue medication management, group therapy and gather collateral    Progress note: Pt presented with a flat affect and congruent mood. Pt denied SI/HI/AVH but endorsed anxiety. Pt spoke about how she 'doesn't need to be here' and that she is an outdoors person so this is difficult. Writer challenged her and discussed the severity of her suicide attempt and that the pt needs treatment so that she doesn't feel the need to do that again. Pt voiced understanding but still minimized actions that led to hospitalization. Pt still needs an inpatient level of care due to lack of insight into suicide attempt and needing to process further      Writer spoke with pts mother, Hermelinda. Hermelinda said that she has suspected that things haven't been going well with her  for a little. Hermelinda said that the pt has been working a lot and not eating/sleeping as well. Hermelinda said that the pt is usually 'always on the move' so this environment is difficult. Hermelinda confirmed that the weapons were removed from the home and that the pt is wanting to see her therapist weekly. Writer explained the D19 process and Hermelinda verbalized understanding. Hermelinda said she thinks that this 'incident' occurred due to 'everything hitting her at once' regarding her marriage and stress.           LOS:  1  Expected LOS: 5-7 days     Insurance info/prescription coverage:  Blue Cross/Cigna  Date of last family contact:  today  Family requesting physician contact today:  no  Discharge plan:  Therapist and pt will work to determine   Guns in the home:  no   Outpatient provider(s):  Zayra Medeiros    Participating treatment team members: Marie Mariee, * (assigned SW), DELORES Smith

## 2021-06-28 NOTE — BH NOTES
Dx: Major Depression     Affect restricted. Said she is expecting to be d/c today. \"I shouldn't be here any way. \" Talked freely about her , leaving her, for another woman, and putting a gun to her head, to self harm. \"I had been pushing my emotions down, for a long x. \" Said she should have talked to her female friends, who have medical back grounds. Denied feeling depressed, anxious, and/or suicidal. Appears clean, in appearance; however, pt has not attended to her hygiene, this shift. Encouraged to attend groups. Q 15 mins checks maintained, for safety. Attended some groups.

## 2021-06-28 NOTE — BH NOTES
Patient alert and oriented. Flat affect, cooperative. Denies suicidal or homicidal ideation. Denies auditory or visual hallucinations. Ambulating in room and hallway. Up to solarium briefly. Denies depression, anxiety. Resting in bed at this time.

## 2021-06-29 PROCEDURE — 65220000003 HC RM SEMIPRIVATE PSYCH

## 2021-06-29 PROCEDURE — 74011250637 HC RX REV CODE- 250/637: Performed by: PSYCHIATRY & NEUROLOGY

## 2021-06-29 RX ADMIN — SERTRALINE HYDROCHLORIDE 50 MG: 50 TABLET ORAL at 09:38

## 2021-06-29 RX ADMIN — BUPROPION HYDROCHLORIDE 150 MG: 150 TABLET, FILM COATED, EXTENDED RELEASE ORAL at 09:38

## 2021-06-29 NOTE — GROUP NOTE
[facility]  GROUP DOCUMENTATION INDIVIDUAL                                                                          Group Therapy Note    Date: 6/29/2021    Group Start Time: 6277  Group End Time: 5026  Group Topic: Process Group - Inpatient    [facility] CARE MANAGEMENT    Savanah Echeverria    [facility] [place] GROUP DOCUMENTATION GROUP    Group Therapy Note    Attendees: 4 out of 12 patients attended    The purpose of the group is to check in and see how the pts are doing. The writer facilitated the group and guided the discussion. Attendance: Attended    Patient's Goal:  To attend groups     Interventions/techniques: Informed, Promoted peer support, Provide feedback and Supported    Follows Directions: Followed directions    Interactions: Interacted appropriately    Mental Status: Anxious, Preoccupied and Worried    Behavior/appearance: Attentive, Cooperative and Disheveled    Goals Achieved: Able to engage in interactions, Able to listen to others, Able to give feedback to another, Able to receive feedback, Able to self-disclose, Discussed discharge plans and Identified feelings      Additional Notes: The pt disclosed that she is feeling anxious as she found out that her  had cheated on her. She has a 15year old daughter that she is wanting to get sole custody of and said she is planning on how to better her life. The pt is thinking of  she can contact to represent her among other legal issues she feels she will run into.       DELORES Candelario Intern

## 2021-06-29 NOTE — BH NOTES
Collateral     Writer reached out to pts mother, Hermelinda. Hermelinda said that the pt wasn't eating or sleeping right and this is what led to 'her crashing down'. Hermelinda said she does not think 'this will happen again'. Writer asked what time Laura Cotton is available for family session and writer scheduled family session for 6/30/21 at [facility].

## 2021-06-29 NOTE — BH NOTES
Behavioral Health Treatment Team Note     Patient goal(s) for today: \"Planning how to  the pieces in my life\"  Treatment team focus/goals: To attend groups and continue medication management     Progress note: The pt presented a calm affect and described her mood as \"good\". Her appearance was disheveled and her behavior was a tad guarded. The pt was cooperative with the writer and oriented x4. Her speech was clear and her processesing/ content was clear and goal driven. The pt denied SI/HI/ and AVH and her judgement of her status was appropriate for her situation. The  will continue to  and plan for discharge with the pt.       LOS:  2  Expected LOS: 3-5 Days     Insurance info/prescription coverage:  Blue Cross/L[employer]andana  Date of last family contact: 06/28/2021  Family requesting physician contact today:  no  Discharge plan:  Therapist and pt will work to determine   Guns in the home:  no   Outpatient provider(s):  Suzanna Adorno    Participating treatment team members: Cuong Sanchez, * (assigned SW), DELORES Baires Intern

## 2021-06-29 NOTE — GROUP NOTE
IP  GROUP DOCUMENTATION INDIVIDUAL                                                                          Group Therapy Note    Date: 6/29/2021    Group Start Time: 0302  Group End Time: 1400  Group Topic: Recreational/Music Therapy    SRM 2  NON ACUTE    Jesup Guerrero    IP [facility] GROUP DOCUMENTATION GROUP    Group Therapy Note    Facilitated leisure skills group to reinforce positive coping through music, group activities, social interaction and arts/crafts    Attendees: 11/13         Attendance: Attended    Patient's Goal:  Attend group daily     Interventions/techniques: Art integration and Supported    Follows Directions: Followed directions    Interactions: Interacted appropriately    Mental Status: Calm    Behavior/appearance: Cooperative    Goals Achieved: Able to engage in interactions and Able to listen to others      Additional Notes:  Receptive to listening to music and a song she selected while working on word search puzzle.  Interacted with peers and staff    Robert Sutton

## 2021-06-29 NOTE — CONSULTS
Consult    Subjective:     Patient is a 29y.o. year old female anxiety and depression disorder was admitted to psychiatric floor because of depression and suicidal ideation, patient denies any chest pain or shortness of breath nausea vomiting diarrhea no constipation no history of diabetes high blood pressure      Past Medical History:   Diagnosis Date    Anxiety     Constipation       Past Surgical History:   Procedure Laterality Date    HX ABDOMINAL LAPAROSCOPY N/A     HX BREAST AUGMENTATION Bilateral 2019    BILATERAL BREAST LIFT WITH AUGMENTATION WITH FAT GRAFTING; ABDOMINOPLASTY performed by Amanda Yeung MD at [address] HX GASTRIC BYPASS N/A     HX OTHER SURGICAL Bilateral 2019    Brachioplasty    HX TENDON / LIGAMENT TRANSPLANT Left 2016    Foot- with hardware    HX TONSILLECTOMY       Family History   Problem Relation Age of Onset    No Known Problems Mother     High Cholesterol Father     Diabetes Father     Hypertension Father     Malignant Hyperthermia Neg Hx       Social History     Tobacco Use    Smoking status: Former Smoker     Packs/day: 1.00     Years: 12.00     Pack years: 12.00     Types: Cigarettes     Quit date: 2016     Years since quittin.5    Smokeless tobacco: Never Used   Substance Use Topics    Alcohol use: Yes     Comment: occ       Current Facility-Administered Medications   Medication Dose Route Frequency Provider Last Rate Last Admin    hydrOXYzine HCL (ATARAX) tablet 50 mg  50 mg Oral TID PRN Luna TITUS MD   50 mg at 21 0852    traZODone (DESYREL) tablet 50 mg  50 mg Oral QHS PRN Sandra Manuel MD        acetaminophen (TYLENOL) tablet 650 mg  650 mg Oral Q4H PRN Marvin Manuel MD        sertraline (ZOLOFT) tablet 50 mg  50 mg Oral DAILY Luna TITUS MD   50 mg at 21 0938    buPROPion XL (WELLBUTRIN XL) tablet 150 mg  150 mg Oral DAILY Gillian Samaniego MD   150 mg at 21 5153    ALPRAZolam [facility]) tablet 0.25 mg  0.25 mg Oral QID PRN Avery Coello MD        alum-mag hydroxide-simeth (MYLANTA) oral suspension 30 mL  30 mL Oral Q4H PRN Avery Coello MD            Allergies   Allergen Reactions    Ginger Other (comments)     Mouth sores        Review of Systems:  Constitutional: Negative for chills and fever. HENT: Negative. Eyes: Negative. Respiratory: Negative. Cardiovascular: Negative. Gastrointestinal: Negative for abdominal pain and nausea. Skin: Negative. Neurological: Negative. Objective: Intake and Output:    No intake/output data recorded. No intake/output data recorded. Physical Exam:   Constitutional: pt is oriented to person, place, and time. HENT:   Head: Normocephalic and atraumatic. Eyes: Pupils are equal, round, and reactive to light. EOM are normal.   Cardiovascular: Normal rate, regular rhythm and normal heart sounds. Pulmonary/Chest: Breath sounds normal. No wheezes. No rales. Exhibits no tenderness. Abdominal: Soft. Bowel sounds are normal. There is no abdominal tenderness. There is no rebound and no guarding. Musculoskeletal: Normal range of motion. Neurological: pt is alert and oriented to person, place, and time. Alert. Normal strength. No cranial nerve deficit or sensory deficit. Displays a negative Romberg sign. Data Review:   No results found for this or any previous visit (from the past 24 hour(s)).     No orders to display        Assessment:     Active Problems:    Major depression (6/27/2021)      Major depression with suicidal ideation    Plan:   Patient on Wellbutrin and Zoloft and Xanax as needed

## 2021-06-29 NOTE — BH NOTES
Nurse Note:    Patient observed in milieu interacting with peers appropriately. Currently denies SI, HI, A/V hallucinations. No report of anxiety or depression this shift. Did not request PRN medications for sleep or for anxiety. No report of pain or discomfort. Will continue to monitor for safety.

## 2021-06-29 NOTE — PROGRESS NOTES
Problem: Depressed Mood (Adult/Pediatric)  Goal: *STG: Remains safe in hospital  Outcome: Progressing Towards Goal     Problem: Depressed Mood (Adult/Pediatric)  Goal: *STG: Complies with medication therapy  Outcome: Progressing Towards Goal    Anthony Montilla is progressing towards these goals.

## 2021-06-29 NOTE — GROUP NOTE
IP  GROUP DOCUMENTATION INDIVIDUAL                                                                          Group Therapy Note    Date: 6/28/2021    Group Start Time: 2290  Group End Time: 1920  Group Topic: Recreational/Music Therapy    [facility] 2  NON ACUTE    Fely Roach    IP [facility] GROUP DOCUMENTATION GROUP    Group Therapy Note    Attendees: 10/13  Introduce healthy leisure task as positive way to cope and manage mood. Attendance: Attended    Patient's Goal:  STG: Attends activities and groups      Interventions/techniques: Art integration and Supported    Follows Directions: Followed directions    Interactions: Interacted appropriately    Mental Status: Calm and Flat    Behavior/appearance: Attentive and Cooperative    Goals Achieved: Able to engage in interactions and Able to listen to others    Additional Notes: Pt attended group and actively participated. Received leisure activity packet and worked on task in group. Pt listened to songs played during group. Pt was quiet but responded to prompts from staff. Pt verbalized enjoyment of group. Was receptive to intervention and used leisure time constructively.      Lala Estimable, CTRS
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
## 2021-06-30 PROCEDURE — 74011250637 HC RX REV CODE- 250/637: Performed by: PSYCHIATRY & NEUROLOGY

## 2021-06-30 PROCEDURE — 65220000003 HC RM SEMIPRIVATE PSYCH

## 2021-06-30 RX ADMIN — SERTRALINE HYDROCHLORIDE 50 MG: 50 TABLET ORAL at 08:56

## 2021-06-30 RX ADMIN — BUPROPION HYDROCHLORIDE 150 MG: 150 TABLET, FILM COATED, EXTENDED RELEASE ORAL at 08:56

## 2021-06-30 NOTE — BH NOTES
Writer reached out to pts therapist, Para Him, to schedule a follow up appointment. Mr Souleymane You said that he can see the pt tomorrow at 6p for an appointment. He said that she sounds 'much better' and that he was glad the pt came to receive treatment. Writer asked if he felt comfortable with the pt returning home and he said yes.
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
## 2021-06-30 NOTE — GROUP NOTE
[facility]  GROUP DOCUMENTATION INDIVIDUAL                                                                          Group Therapy Note    Date: 6/30/2021    Group Start Time: 1300  Group End Time: 1400  Group Topic: Education Group - Inpatient    [facility]    Tasha Gutierrez    IP 1150 [facility] GROUP DOCUMENTATION GROUP    Group Therapy Note    Attendees: 7/10    Patients educated on safety plans, what they are, and how they are used. Patients encouraged to complete their own safety plans and ask questions. Patients encouraged to share openly and honestly as well as to support their peers throughout. Attendance: Attended    Patient's Goal:  Attends activities and groups    Interventions/techniques: Informed, Validated, Promoted peer support, Reinforced and Supported    Follows Directions: Followed directions    Interactions: Interacted appropriately    Mental Status: Calm and Congruent    Behavior/appearance: Attentive, Caretaking, Cooperative, Enthusiastic and Motivated    Goals Achieved: Able to engage in interactions, Able to listen to others, Able to give feedback to another, Able to reflect/comment on own behavior, Able to manage/cope with feelings, Able to self-disclose, Discussed coping and Discussed safety plan      Additional Notes:  Pt attended group and was engaged. Pt was able to complete safety plan. Pt shared some about her relationship with her , how he left her for someone. Pt was supported by group.     Gautam Rosa

## 2021-06-30 NOTE — PROGRESS NOTES
Problem: Depressed Mood (Adult/Pediatric)  Goal: *STG: Remains safe in hospital  Outcome: Progressing Towards Goal     Problem: Suicide  Goal: *STG/LTG: No longer expresses self destructive or suicidal thoughts  Outcome: Progressing Towards Goal    Aggie Gonzalez is progressing towards these goals.

## 2021-06-30 NOTE — GROUP NOTE
IP  GROUP DOCUMENTATION INDIVIDUAL                                                                          Group Therapy Note    Date: 6/30/2021    Group Start Time: 9172  Group End Time: 1600  Group Topic: Process Group - Inpatient    [facility]    Leon Gates    IP 1150 [facility] GROUP DOCUMENTATION GROUP    Group Therapy Note  The therapist encouraged the patient's to process their thoughts and feelings. She also encouraged them to support one another and offer feedback when appropriate to each other. Attendees: 6         Attendance: Attended    Patient's Goal:  To attend groups     Interventions/techniques: Validated and Supported    Follows Directions: Followed directions    Interactions: Interacted appropriately    Mental Status: Calm    Behavior/appearance: Attentive and Cooperative    Goals Achieved: Able to engage in interactions, Able to listen to others, Able to reflect/comment on own behavior and Able to self-disclose      Additional Notes: The patient participated well in group. She shared about her fears in regards to telling her daughter that her father has left them. She said that he had been cheating on her with another woman and just moved to [place] to be with her. She said that she plans on only telling her general things and no details, because she wants her 15year old daughter to make her own decision in regards to how she feels towards her father.      Ame Isaac

## 2021-06-30 NOTE — BH NOTES
Patient social with peers, affect constricted but brightened to broad with good eye contact when talking about getting her life in order. Patient denied SI, HI, AH, VH, depression and anxiety. Patient remains on close observation, Q 15 minute checks.

## 2021-06-30 NOTE — PROGRESS NOTES
Progress Note  Date:2021       Room:[facility]  Patient Name:Jessica Robison     YOB: 1985     Age:36 y.o. Subjective    Subjective   Patient has been engaging in sharing her feelings and processing challenges and she states she is ready to move forward and has been planning on things relating to finances custody and living situations. She denies any active plan of suicide or homicide. Mental status examination  Patient is alert oriented x3 casually dressed groomed has been continuing    Continue active SI HI no meltdowns noted no self-harm gestures or irritability noted. Insight judgment coping continuing to improve  Review of Systems  Objective         Vitals Last 24 Hours:  TEMPERATURE:  Temp  Av.4 °F (36.9 °C)  Min: 98.2 °F (36.8 °C)  Max: 98.8 °F (37.1 °C)  RESPIRATIONS RANGE: Resp  Av  Min: 18  Max: 18  PULSE OXIMETRY RANGE: SpO2  Av %  Min: 100 %  Max: 100 %  PULSE RANGE: Pulse  Av  Min: 93  Max: 102  BLOOD PRESSURE RANGE: Systolic (29APY), QII:849 , Min:104 , QBP:902   ; Diastolic (68PID), AFT:31, Min:68, Max:71    I/O (24Hr): No intake or output data in the 24 hours ending 21  Objective  Labs/Imaging/Diagnostics    Labs:  CBC:No results for input(s): WBC, RBC, HGB, HCT, MCV, RDW, PLT, HGBEXT, HCTEXT, PLTEXT in the last 72 hours. CHEMISTRIES:No results for input(s): NA, K, CL, CO2, BUN, CA, PHOS, MG in the last 72 hours. No lab exists for component: CREATININE, GLUCOSEPT/INR:No results for input(s): INR, INREXT in the last 72 hours. No lab exists for component: PROTIME  APTT:No results for input(s): APTT in the last 72 hours. LIVER PROFILE:No results for input(s): AST, ALT in the last 72 hours. No lab exists for component: Cher Mayers ALKPHOS  Lab Results   Component Value Date/Time    ALT (SGPT) 2021 05:40 PM    AST (SGOT) 17 2021 05:40 PM    Alk.  phosphatase 69 2021 05:40 PM Bilirubin, total 0.4 06/26/2021 05:40 PM       Imaging Last 24 Hours:  No results found.   Assessment//Plan   Active Problems:    Major depression (6/27/2021)      Assessment & Plan  No medication changes  Continue group therapy  Family meeting scheduled for tomorrow  Continue to process her stressors, depression or support      Current Facility-Administered Medications:     hydrOXYzine HCL (ATARAX) tablet 50 mg, 50 mg, Oral, TID PRN, Autry Leventhal, Haluk K, MD, 50 mg at 06/27/21 0852    traZODone (DESYREL) tablet 50 mg, 50 mg, Oral, QHS PRN, John Manuel MD    acetaminophen (TYLENOL) tablet 650 mg, 650 mg, Oral, Q4H PRN, Marvin Manuel MD    sertraline (ZOLOFT) tablet 50 mg, 50 mg, Oral, DAILY, Marvin Manuel MD, 50 mg at 06/29/21 4984    buPROPion XL (WELLBUTRIN XL) tablet 150 mg, 150 mg, Oral, DAILY, Marvin Manuel MD, 150 mg at 06/29/21 1143    ALPRAZolam (XANAX) tablet 0.25 mg, 0.25 mg, Oral, QID PRN, John Manuel MD    alum-mag hydroxide-simeth (MYLANTA) oral suspension 30 mL, 30 mL, Oral, Q4H PRN, Genny Piedra MD  Electronically signed by Brandin Mccann MD on 6/29/2021 at 10:02 PM

## 2021-06-30 NOTE — GROUP NOTE
[facility]  GROUP DOCUMENTATION INDIVIDUAL                                                                          Group Therapy Note    Date: 6/29/2021    Group Start Time: 1030  Group End Time: 0277  Group Topic: Nursing    [facility] ACUTE    Rosey Hernandez LPN    [facility] GROUP DOCUMENTATION GROUP    Group Therapy Note    Attendees: 9/13         Attendance: Attended    Patient's Goal: ID benefits of medications. Interventions/techniques: Informed    Follows Directions: Followed directions    Interactions: Interacted appropriately    Mental Status: Calm    Behavior/appearance: Attentive    Goals Achieved: Able to listen to others      Additional Notes:  Pt. Stated medications decreased panic attacks.     Monie Gomez LPN

## 2021-06-30 NOTE — BH NOTES
Nurse Note:    Andria Perez observed isolative to the room resting quietly in bed. No concerns were reported and none were observed. Andria Perez currently denies SI, HI, A/V hallucinations. She does not report anxiety and depression; rated both 0/10. Current mood is calm and is looking forward to discharging. No report of pain, difficulty eating, or sleeping. Will continue to monitor for safety.

## 2021-07-01 VITALS
HEART RATE: 88 BPM | SYSTOLIC BLOOD PRESSURE: 103 MMHG | OXYGEN SATURATION: 100 % | DIASTOLIC BLOOD PRESSURE: 72 MMHG | HEIGHT: 67 IN | BODY MASS INDEX: 24.33 KG/M2 | RESPIRATION RATE: 18 BRPM | TEMPERATURE: 98.4 F | WEIGHT: 155 LBS

## 2021-07-01 PROCEDURE — 74011250637 HC RX REV CODE- 250/637: Performed by: PSYCHIATRY & NEUROLOGY

## 2021-07-01 RX ORDER — SERTRALINE HYDROCHLORIDE 50 MG/1
50 TABLET, FILM COATED ORAL DAILY
Qty: 30 TABLET | Refills: 0 | Status: SHIPPED | OUTPATIENT
Start: 2021-07-02

## 2021-07-01 RX ORDER — BUPROPION HYDROCHLORIDE 150 MG/1
150 TABLET ORAL DAILY
Qty: 30 TABLET | Refills: 0 | Status: SHIPPED | OUTPATIENT
Start: 2021-07-02

## 2021-07-01 RX ORDER — TRAZODONE HYDROCHLORIDE 50 MG/1
50 TABLET ORAL
Qty: 30 TABLET | Refills: 0 | Status: SHIPPED | OUTPATIENT
Start: 2021-07-01

## 2021-07-01 RX ADMIN — BUPROPION HYDROCHLORIDE 150 MG: 150 TABLET, FILM COATED, EXTENDED RELEASE ORAL at 08:16

## 2021-07-01 RX ADMIN — SERTRALINE HYDROCHLORIDE 50 MG: 50 TABLET ORAL at 08:16

## 2021-07-01 NOTE — GROUP NOTE
IP  GROUP DOCUMENTATION INDIVIDUAL                                                                          Group Therapy Note    Date: 6/30/2021    Group Start Time: 1900  Group End Time: 1945  Group Topic: Recreational/Music Therapy    [facility] 2  NON ACUTE    Frieda Whitaker    IP [facility] GROUP DOCUMENTATION GROUP    Group Therapy Note    Attendees: 7/7   Introduce healthy leisure task skill as positive way to cope and manage mood. Attendance: Attended    Patient's Goal: STG: Attends activities and groups      Interventions/techniques: Art integration and Supported    Follows Directions: Followed directions    Interactions: Interacted appropriately    Mental Status: Calm and Flat    Behavior/appearance: Attentive and Cooperative    Goals Achieved: Able to engage in interactions and Able to listen to others    Additional Notes: Attended group and actively participated. Was receptive to intervention and received leisure packet. Worked on puzzles and art task and selected songs to listen to in group. Used leisure time constructively.      Bernard Hallman , NOEMIS

## 2021-07-01 NOTE — BH NOTES
DISCHARGE SUMMARY    NAME:Jessica De La Garza  : 1985  MRN: [id number]    The patient Kenyetta Poser exhibits the ability to control behavior in a less restrictive environment. Patient's level of functioning is improving. No assaultive/destructive behavior has been observed for the past 24 hours. No suicidal/homicidal threat or behavior has been observed for the past 24 hours. There is no evidence of serious medication side effects. Patient has not been in physical or protective restraints for at least the past 24 hours. If weapons involved, how are they secured? Yes, all weapons have been removed from the home. Confirmed by pts mother, Hermelinda. Is patient aware of and in agreement with discharge plan? yes    Arrangements for medication:  Prescriptions given to patient, given a weeks supply or 30 day supply. Copy of discharge instructions to provider?:  yes    Arrangements for transportation home:  Pt is going to be picked up by her mother, Hermelinda. Keep all follow up appointments as scheduled, continue to take prescribed medications per physician instructions.   Mental health crisis number:  954 or your local mental health crisis line number at [place] [address] Emergency WARM LINE      7-371-720-MHAV ([phone])      M-F: 9am to 9pm      Sat & Sun: 5pm  9pm  National suicide prevention lines:                             8-715-YVXWEXN (8-540-770-694-291-3315)       3-221-894-TALK ([phone])    Crisis Text Line:  Text HOME to 450510

## 2021-07-01 NOTE — BH NOTES
Patient up social with peers, affect dull but brightens when talking about changing jobs and her future. Patient anticipating discharge today. Patient denied SI, HI, AH, VH, depression, and anxiety. Patient remains on close observation Q 15 minute checks.

## 2021-07-01 NOTE — SUICIDE SAFETY PLAN
SAFETY PLAN    A suicide Safety Plan is a document that supports someone when they are having thoughts of suicide. Warning Signs that indicate a suicidal crisis may be developing: What (situations, thoughts, feelings, body sensations, behaviors, etc.) do you experience that lets you know you are beginning to think about suicide? 1. isolating  2. Tunnel vision  3. panic    Internal Coping Strategies:  What things can I do (relaxation techniques, hobbies, physical activities, etc.) to take my mind off my problems without contacting another person? 1. Go for a walk  2. Play with the animals   3. Clean the house     People and social settings that provide distraction: Who can I call or where can I go to distract me? 1. Name: Mom  Phone: [phone]  2. Name: Linda Diamond  Phone: [phone]   3. Place: [email or web address]            4. Place: shopping    People whom I can ask for help: Who can I call when I need help - for example, friends, family, clergy, someone else? 1. Name: Nancy Munoz                Phone: [phone]  2. Name: Jaswinder Nicol  Phone: per pt, number is in phone   3. Name: Kelly Augustine  Phone: per pt, number is in phone     Professionals or [facility] [address] I can contact during a crisis: Who can I call for help - for example, my doctor, my psychiatrist, my psychologist, a mental health provider, a suicide hotline? 1. Clinician Name: [facility]    Phone: [phone]      Clinician Pager or Emergency Contact #: [phone]    5. Clinician Name: Oliver Chung   Phone: [phone]      Clinician Pager or Emergency Contact #: [phone]    9. Suicide Prevention Lifeline: 5-799-556-TALK (6800)    4. [address] [place] [address] Emergency Services -  for example, [facility] suicide hotline, [address] [place] [address] Avenue: [facility]      Emergency Services Address: [address]ui Fla[place]       Emergency Services Phone: [phone]    Making the environment safe:  How can I make my environment (house/apartment/living space) safer? For example, can I remove guns, medications, and other items? 1. Remove weapons   2.  Get a roommate

## 2021-07-01 NOTE — BH NOTES
Patient denies depression, anxiety, SI, HI and hallucinations. Pt was visible on unit, going to music group and in the day room watching television, coloring and interacting with peers. Pt was calm and pleasant. Pt appears to be sleeping well, no distress noted. Will continue to monitor patient every 15 mins as pr unit protocol.

## 2021-07-01 NOTE — PROGRESS NOTES
Progress Note  Date:2021       Room:[facility]  Patient Name:Jessica Robison     YOB: 1985     Age:36 y.o. Subjective    Subjective   Patient reports she has been feeling better and has been engaging in group therapy. She continues to be honestly Open about her feelings of accepting marriage is over. Patient states she has been feeling more stronger and wanting to feel herself. Denies any active plan of suicide or homicide. She states she would share to her daughter when she goes in person about challenges that the family will go through. Mental status examination-patient is alert oriented x3 casually dressed no flight of ideas no loosening of associations denies any active SI HI intent or plan no evidence of any psychosis no acute distress noted she has been noted appropriately walking the hallway with peers and keeping herself active. She is noted appropriate interactions without any isolation. Insight judgment and coping continuing to improve  Objective         Vitals Last 24 Hours:  TEMPERATURE:  Temp  Av.4 °F (36.9 °C)  Min: 98.1 °F (36.7 °C)  Max: 98.6 °F (37 °C)  RESPIRATIONS RANGE: Resp  Av  Min: 18  Max: 18  PULSE OXIMETRY RANGE: SpO2  Av %  Min: 100 %  Max: 100 %  PULSE RANGE: Pulse  Av  Min: 84  Max: 86  BLOOD PRESSURE RANGE: Systolic (57VUB), CHT:290 , Min:99 , IIF:272   ; Diastolic (27SZQ), XEJ:53, Min:73, Max:73    I/O (24Hr): No intake or output data in the 24 hours ending 21 2223  Objective  Labs/Imaging/Diagnostics    Labs:  CBC:No results for input(s): WBC, RBC, HGB, HCT, MCV, RDW, PLT, HGBEXT, HCTEXT, PLTEXT in the last 72 hours. CHEMISTRIES:No results for input(s): NA, K, CL, CO2, BUN, CA, PHOS, MG in the last 72 hours. No lab exists for component: CREATININE, GLUCOSEPT/INR:No results for input(s): INR, INREXT in the last 72 hours.     No lab exists for component: PROTIME  APTT:No results for input(s): APTT in the last 72 hours. LIVER PROFILE:No results for input(s): AST, ALT in the last 72 hours. No lab exists for component: [facility]  Lab Results   Component Value Date/Time    ALT (SGPT) 24 06/26/2021 05:40 PM    AST (SGOT) 17 06/26/2021 05:40 PM    Alk. phosphatase 69 06/26/2021 05:40 PM    Bilirubin, total 0.4 06/26/2021 05:40 PM       Imaging Last 24 Hours:  No results found. Assessment//Plan   Active Problems:    Major depression (6/27/2021)      Assessment & Plan  No medication changes  Family meeting today reviewed  Continue group therapy  To reach out to her therapist on the outpatient and to transition care  To also explore partial hospitalization program to help with her intense therapy she continues through her separation process.   Electronically signed by Michele Ramirez MD on 6/30/2021 at 10:23 PM

## 2021-07-01 NOTE — BH NOTES
Patient given her discharge instructions, patient verbalized understanding. Patient given her valuables and left unit with personal belongings to the care of her mother.

## 2021-07-01 NOTE — BH NOTES
Pt. Encouraged to attend group but did not attend. Psycho-ED/Diaphragmatic breathing. Pt was preparing to discharge

## 2021-07-01 NOTE — PROGRESS NOTES
Problem: Depressed Mood (Adult/Pediatric)  Goal: *STG: Participates in treatment plan  Outcome: Progressing Towards Goal  Goal: *STG: Attends activities and groups  Outcome: Progressing Towards Goal  Goal: *STG: Remains safe in hospital  Outcome: Progressing Towards Goal  Goal: *STG: Complies with medication therapy  Outcome: Progressing Towards Goal     Problem: Suicide  Goal: *STG: Remains safe in hospital  Outcome: Progressing Towards Goal  Goal: *STG: Attends activities and groups  Outcome: Progressing Towards Goal  Goal: *STG/LTG: Complies with medication therapy  Outcome: Progressing Towards Goal  Goal: *STG/LTG: No longer expresses self destructive or suicidal thoughts  Outcome: Progressing Towards Goal

## 2021-07-01 NOTE — BH NOTES
Behavioral Health Transition Record to Provider    Patient Name: Brittney Li  YOB: 1985  Medical Record Number: [id number]  Date of Admission: 6/26/2021  Date of Discharge: 7.1.21    Attending Provider: Leydi Joyce MD  Discharging Provider: Dr Nabor Babcock  To contact this individual call [phone] and ask the  to page. If unavailable, ask to be transferred to [address] [place] [address] Provider on call. [facility] Provider will be available on call 24/7 and during holidays. Primary Care Provider: Colletta Register, MD    Allergies   Allergen Reactions    Ginger Other (comments)     Mouth sores       Reason for Admission: Pt was admitted for suicidal ideation and attempt with trying to shoot herself. Pt reported increased anxiety and depression with less sleep and poor appetite    Admission Diagnosis: Major depression [F32.9]    * No surgery found *    Results for orders placed or performed during the hospital encounter of 06/26/21   COVID-19 RAPID TEST   Result Value Ref Range    Specimen source Nasopharyngeal      COVID-19 rapid test Not Detected Not Detected     CBC WITH AUTOMATED DIFF   Result Value Ref Range    WBC 9.2 3.6 - 11.0 K/uL    RBC 5.16 3.80 - 5.20 M/uL    HGB 14.1 11.5 - 16.0 g/dL    HCT 44.6 35.0 - 47.0 %    MCV 86.4 80.0 - 99.0 FL    MCH 27.3 26.0 - 34.0 PG    MCHC 31.6 30.0 - 36.5 g/dL    RDW 14.0 11.5 - 14.5 %    PLATELET 878 (H) 723 - 400 K/uL    MPV 10.5 8.9 - 12.9 FL    NRBC 0.0 0.0  WBC    ABSOLUTE NRBC 0.00 0.00 - 0.01 K/uL    NEUTROPHILS 75 32 - 75 %    LYMPHOCYTES 19 12 - 49 %    MONOCYTES 6 5 - 13 %    EOSINOPHILS 0 0 - 7 %    BASOPHILS 0 0 - 1 %    IMMATURE GRANULOCYTES 0 0 - 0.5 %    ABS. NEUTROPHILS 6.8 1.8 - 8.0 K/UL    ABS. LYMPHOCYTES 1.7 0.8 - 3.5 K/UL    ABS. MONOCYTES 0.6 0.0 - 1.0 K/UL    ABS. EOSINOPHILS 0.0 0.0 - 0.4 K/UL    ABS. BASOPHILS 0.0 0.0 - 0.1 K/UL    ABS. IMM.  GRANS. 0.0 0.00 - 0.04 K/UL    DF AUTOMATED     METABOLIC PANEL, COMPREHENSIVE   Result Value Ref Range    Sodium 139 136 - 145 mmol/L    Potassium 3.6 3.5 - 5.1 mmol/L    Chloride 105 97 - 108 mmol/L    CO2 26 21 - 32 mmol/L    Anion gap 8 5 - 15 mmol/L    Glucose 98 65 - 100 mg/dL    BUN 8 6 - 20 mg/dL    Creatinine 0.68 0.55 - 1.02 mg/dL    BUN/Creatinine ratio 12 12 - 20      GFR est AA >60 >60 ml/min/1.73m2    GFR est non-AA >60 >60 ml/min/1.73m2    Calcium 9.5 8.5 - 10.1 mg/dL    Bilirubin, total 0.4 0.2 - 1.0 mg/dL    AST (SGOT) 17 15 - 37 U/L    ALT (SGPT) 24 12 - 78 U/L    Alk. phosphatase 69 45 - 117 U/L    Protein, total 8.0 6.4 - 8.2 g/dL    Albumin 4.4 3.5 - 5.0 g/dL    Globulin 3.6 2.0 - 4.0 g/dL    A-G Ratio 1.2 1.1 - 2.2     URINALYSIS W/ REFLEX CULTURE    Specimen: Urine   Result Value Ref Range    Color Yellow/Straw      Appearance Turbid (A) Clear      Specific gravity 1.023 1.003 - 1.030      pH (UA) 5.0 5.0 - 8.0      Protein Negative Negative mg/dL    Glucose Negative Negative mg/dL    Ketone 80 (A) Negative mg/dL    Bilirubin Negative Negative      Blood Negative Negative      Urobilinogen 2.0 (H) 0.1 - 1.0 EU/dL    Nitrites Negative Negative      Leukocyte Esterase Negative Negative      UA:UC IF INDICATED Culture not indicated by UA result Culture not indicated by UA result      WBC 0-4 0 - 4 /hpf    RBC 0-5 0 - 5 /hpf    Bacteria Negative Negative /hpf    Mucus 3+ /lpf   DRUG SCREEN, URINE   Result Value Ref Range    AMPHETAMINES Positive (A) Negative      BARBITURATES Negative Negative      BENZODIAZEPINES Positive (A) Negative      COCAINE Negative Negative      METHADONE Negative Negative      OPIATES Negative Negative      PCP(PHENCYCLIDINE) Negative Negative      THC (TH-CANNABINOL) Negative Negative      Drug screen comment        This test is a screen for drugs of abuse in a medical setting only (i.e., they are unconfirmed results and as such must not be used for non-medical purposes, e.g.,employment testing, legal testing).  Due to its inherent nature, false positive (FP) and false negative (FN) results may be obtained. Therefore, if necessary for medical care, recommend confirmation of positive findings by GC/MS. HCG QL SERUM   Result Value Ref Range    HCG, Ql. Negative Negative     SARS-COV-2   Result Value Ref Range    SARS-CoV-2 Please find results under separate order         Immunizations administered during this encounter: There is no immunization history on file for this patient. Screening for Metabolic Disorders for Patients on Antipsychotic Medications  (Data obtained from the EMR)    Estimated Body Mass Index  Estimated body mass index is 24.28 kg/m² as calculated from the following:    Height as of this encounter: 5' 7\" (1.702 m). Weight as of this encounter: 70.3 kg (155 lb). Vital Signs/Blood Pressure  Visit Vitals  /72   Pulse 88   Temp 98.4 °F (36.9 °C)   Resp 18   Ht 5' 7\" (1.702 m)   Wt 70.3 kg (155 lb)   SpO2 100%   Breastfeeding No   BMI 24.28 kg/m²       Blood Glucose/Hemoglobin A1c  Lab Results   Component Value Date/Time    Glucose 98 06/26/2021 05:40 PM       No results found for: HBA1C, LPR9BPHO     Lipid Panel  No results found for: CHOL, CHOLX, CHLST, CHOLV, 836326, HDL, HDLP, LDL, LDLC, DLDLP, TGLX, TRIGL, TRIGP, CHHD, CHHDX     Discharge Diagnosis: major depression    Discharge Plan: pt is returning home with outpatient coordinated with her therapist. Pt has also been set up with support groups for emotional trauma     Discharge Medication List and Instructions:   Current Discharge Medication List      START taking these medications    Details   traZODone (DESYREL) 50 mg tablet Take 1 Tablet by mouth nightly as needed for Sleep (For insomnia). Qty: 30 Tablet, Refills: 0  Start date: 7/1/2021         CONTINUE these medications which have CHANGED    Details   buPROPion XL (WELLBUTRIN XL) 150 mg tablet Take 1 Tablet by mouth daily.   Qty: 30 Tablet, Refills: 0  Start date: 7/2/2021      sertraline (ZOLOFT) 50 mg tablet Take 1 Tablet by mouth daily. Indications: anxiousness associated with depression, panic disorder  Qty: 30 Tablet, Refills: 0  Start date: 7/2/2021         CONTINUE these medications which have NOT CHANGED    Details   ALPRAZolam (XANAX) 0.25 mg tablet Take 0.25 mg by mouth daily as needed for Anxiety. Unresulted Labs (24h ago, onward)    None        To obtain results of studies pending at discharge, please contact [phone]    Follow-up Information     Follow up With Specialties Details Why Contact Ronda    Agusto Nidia   Go on 7/1/2021 appt scheduled for 6p [address] [facility],[address], [address]    [address] [facility], [address]  Call for empowerment and resiliant based support groups  [address], [address] [facility]  [id number], [address] [facility], [address],[facility] [phone]            Advanced Directive:   Does the patient have an appointed surrogate decision maker? No  Does the patient have a Medical Advance Directive? No  Does the patient have a Psychiatric Advance Directive? No  If the patient does not have a surrogate or Medical Advance Directive AND Psychiatric Advance Directive, the patient was offered information on these advance directives Patient will complete at a later time    Patient Instructions: Please continue all medications until otherwise directed by physician. Tobacco Cessation Discharge Plan:   Is the patient a smoker and needs referral for smoking cessation? No  Patient referred to the following for smoking cessation with an appointment? Not applicable     Patient was offered medication to assist with smoking cessation at discharge? Not applicable  Was education for smoking cessation added to the discharge instructions? Not applicable    Alcohol/Substance Abuse Discharge Plan:   Does the patient have a history of substance/alcohol abuse and requires a referral for treatment?  Not applicable  Patient referred to the following for substance/alcohol abuse treatment with an appointment? Not applicable  Patient was offered medication to assist with alcohol cessation at discharge? Not applicable  Was education for substance/alcohol abuse added to discharge instructions? Not applicable    Patient discharged to Home; provided to the patient/caregiver either in hard copy or electronically.

## 2021-07-25 NOTE — DISCHARGE SUMMARY
PSYCHIATRIC DISCHARGE SUMMARY         IDENTIFICATION:    Patient Name  Eulalia Carter   Date of Birth 1985   [facility] [phone]   Medical Record Number  [id number]      Age  29 y.o. PCP Gena Davenport MD   Admit date:  6/26/2021    Discharge date: 7/1/2021   Room Number  231/01  @ [address] [facility]   Date of Service  7/1/2021            TYPE OF DISCHARGE: REGULAR               CONDITION AT DISCHARGE: stable       PROVISIONAL & DISCHARGE DIAGNOSES:      Major depression  Anxiety disorder unspecified     CC & HISTORY OF PRESENT ILLNESS:  Etta Fernandez is a 28-year-old female who was brought to the emergency room by her mother and sister-in-law after suicide attempt to shoot herself with a gun. She reports that her  of 14 years ended relationship abruptly and told her to not to call her. She was upset and might have had a gun in the air or plan to shoot herself with a gun. She has been having marital problems. Patient stated that she has been  for 14 years. She also has been experiencing increasing depression anxiety decreased appetite decreased sleep and having racing thoughts in the last 2 months. She denies acute psychotic symptoms denies hearing voices or paranoid ideations. She reports that this was a unexpected situation for her and she could not handle herself well and she regrets what she has done at this point. She does not intend to harm herself at this point but notices that what she has done shooting a gun in the air and suicidal ideations prompted the hospitalization and she wants to get help. She has some supports. She denies acute psychotic symptoms acute manic symptoms no hearing voices or paranoid ideations. She denies active medical problems. She denies alcohol or substance abuse. At times she has been tearful depressed sad. Also admits to low energy and low motivation. Sleep is fair. She has been more anxious lately.   Apparently her  said that she was visiting somebody in [place] but in fact he was visiting his girlfriend. SOCIAL HISTORY:    Social History     Socioeconomic History    Marital status:      Spouse name: Not on file    Number of children: Not on file    Years of education: Not on file    Highest education level: Not on file   Occupational History    Not on file   Tobacco Use    Smoking status: Former Smoker     Packs/day: 1.00     Years: 12.00     Pack years: 12.00     Types: Cigarettes     Quit date: 2016     Years since quittin.6    Smokeless tobacco: Never Used   Substance and Sexual Activity    Alcohol use: Yes     Comment: occ    Drug use: Never    Sexual activity: Not on file   Other Topics Concern    Not on file   Social History Narrative    Not on file     Social Determinants of Health     Financial Resource Strain:     Difficulty of Paying Living Expenses:    Food Insecurity:     Worried About Running Out of Food in the Last Year:     [address] in the Last Year:    Transportation Needs:     Lack of Transportation (Medical):      Lack of Transportation (Non-Medical):    Physical Activity:     Days of Exercise per Week:     Minutes of Exercise per Session:    Stress:     Feeling of Stress :    Social Connections:     Frequency of Communication with Friends and Family:     Frequency of Social Gatherings with Friends and Family:     Attends Hindu Services:     Active Member of Clubs or Organizations:     Attends Club or Organization Meetings:     Marital Status:    Intimate Partner Violence:     Fear of Current or Ex-Partner:     Emotionally Abused:     Physically Abused:     Sexually Abused:       FAMILY HISTORY:   Family History   Problem Relation Age of Onset    No Known Problems Mother     High Cholesterol Father     Diabetes Father     Hypertension Father     Malignant Hyperthermia Neg Hx              HOSPITALIZATION COURSE:    Madelaine Parker was admitted to the inpatient psychiatric unit [facility] for acute psychiatric stabilization in regards to symptomatology as described in the HPI above. While on the unit Karen Clifton was involved in individual, group, occupational and milieu therapy. Psychiatric medications were adjusted during this hospitalization. Karen Clifton demonstrated a slow, but progressive improvement in overall condition. Much of patient's depression appeared to be related to situational stressors,  psychological factors. Please see individual progress notes for more specific details regarding patient's hospitalization course. At time of discharge, Karen Clifton is without significant problems of depression, psychosis, bean. Patient free of suicidal and homicidal ideations and reports many positive predictive factors in terms of not attempting suicide or homicide. Patient with request for discharge today. There are no grounds to seek a TDO. Patient has maximized benefit to be derived from acute inpatient psychiatric treatment. All members of the treatment team concur with each other in regards to plans for discharge today per patient's request.  Patient and family are aware and in agreement with discharge and discharge plan.          LABS AND IMAGAING:    Labs Reviewed   CBC WITH AUTOMATED DIFF - Abnormal; Notable for the following components:       Result Value    PLATELET 588 (*)     All other components within normal limits   URINALYSIS W/ REFLEX CULTURE - Abnormal; Notable for the following components:    Appearance Turbid (*)     Ketone 80 (*)     Urobilinogen 2.0 (*)     All other components within normal limits   DRUG SCREEN, URINE - Abnormal; Notable for the following components:    AMPHETAMINES Positive (*)     BENZODIAZEPINES Positive (*)     All other components within normal limits   COVID-19 RAPID TEST   METABOLIC PANEL, COMPREHENSIVE   HCG QL SERUM   SARS-COV-2     No results found for: DS35, PHEN, PHENO, PHENT, DILF, DS39, PHENY, PTN, VALF2, VALAC, VALP, VALPR, DS6, CRBAM, CRBAMP, CARB2, XCRBAM  Admission on 06/26/2021, Discharged on 07/01/2021   Component Date Value Ref Range Status    WBC 06/26/2021 9.2  3.6 - 11.0 K/uL Final    RBC 06/26/2021 5.16  3.80 - 5.20 M/uL Final    HGB 06/26/2021 14.1  11.5 - 16.0 g/dL Final    HCT 06/26/2021 44.6  35.0 - 47.0 % Final    MCV 06/26/2021 86.4  80.0 - 99.0 FL Final    MCH 06/26/2021 27.3  26.0 - 34.0 PG Final    MCHC 06/26/2021 31.6  30.0 - 36.5 g/dL Final    RDW 06/26/2021 14.0  11.5 - 14.5 % Final    PLATELET 08/26/1628 609* 150 - 400 K/uL Final    MPV 06/26/2021 10.5  8.9 - 12.9 FL Final    NRBC 06/26/2021 0.0  0.0  WBC Final    ABSOLUTE NRBC 06/26/2021 0.00  0.00 - 0.01 K/uL Final    NEUTROPHILS 06/26/2021 75  32 - 75 % Final    LYMPHOCYTES 06/26/2021 19  12 - 49 % Final    MONOCYTES 06/26/2021 6  5 - 13 % Final    EOSINOPHILS 06/26/2021 0  0 - 7 % Final    BASOPHILS 06/26/2021 0  0 - 1 % Final    IMMATURE GRANULOCYTES 06/26/2021 0  0 - 0.5 % Final    ABS. NEUTROPHILS 06/26/2021 6.8  1.8 - 8.0 K/UL Final    ABS. LYMPHOCYTES 06/26/2021 1.7  0.8 - 3.5 K/UL Final    ABS. MONOCYTES 06/26/2021 0.6  0.0 - 1.0 K/UL Final    ABS. EOSINOPHILS 06/26/2021 0.0  0.0 - 0.4 K/UL Final    ABS. BASOPHILS 06/26/2021 0.0  0.0 - 0.1 K/UL Final    ABS. IMM.  GRANS. 06/26/2021 0.0  0.00 - 0.04 K/UL Final    DF 06/26/2021 AUTOMATED    Final    Sodium 06/26/2021 139  136 - 145 mmol/L Final    Potassium 06/26/2021 3.6  3.5 - 5.1 mmol/L Final    Chloride 06/26/2021 105  97 - 108 mmol/L Final    CO2 06/26/2021 26  21 - 32 mmol/L Final    Anion gap 06/26/2021 8  5 - 15 mmol/L Final    Glucose 06/26/2021 98  65 - 100 mg/dL Final    BUN 06/26/2021 8  6 - 20 mg/dL Final    Creatinine 06/26/2021 0.68  0.55 - 1.02 mg/dL Final    BUN/Creatinine ratio 06/26/2021 12  12 - 20   Final    GFR est AA 06/26/2021 >60  >60 ml/min/1.73m2 Final    GFR est non-AA 06/26/2021 >60  >60 ml/min/1.73m2 Final    Calcium 06/26/2021 9.5  8.5 - 10.1 mg/dL Final    Bilirubin, total 06/26/2021 0.4  0.2 - 1.0 mg/dL Final    AST (SGOT) 06/26/2021 17  15 - 37 U/L Final    ALT (SGPT) 06/26/2021 24  12 - 78 U/L Final    Alk.  phosphatase 06/26/2021 69  45 - 117 U/L Final    Protein, total 06/26/2021 8.0  6.4 - 8.2 g/dL Final    Albumin 06/26/2021 4.4  3.5 - 5.0 g/dL Final    Globulin 06/26/2021 3.6  2.0 - 4.0 g/dL Final    A-G Ratio 06/26/2021 1.2  1.1 - 2.2   Final    Color 06/26/2021 Yellow/Straw    Final    Appearance 06/26/2021 Turbid* Clear   Final    Specific gravity 06/26/2021 1.023  1.003 - 1.030   Final    pH (UA) 06/26/2021 5.0  5.0 - 8.0   Final    Protein 06/26/2021 Negative  Negative mg/dL Final    Glucose 06/26/2021 Negative  Negative mg/dL Final    Ketone 06/26/2021 80* Negative mg/dL Final    Bilirubin 06/26/2021 Negative  Negative   Final    Blood 06/26/2021 Negative  Negative   Final    Urobilinogen 06/26/2021 2.0* 0.1 - 1.0 EU/dL Final    Nitrites 06/26/2021 Negative  Negative   Final    Leukocyte Esterase 06/26/2021 Negative  Negative   Final    UA:UC IF INDICATED 06/26/2021 Culture not indicated by UA result  Culture not indicated by UA result   Final    WBC 06/26/2021 0-4  0 - 4 /hpf Final    RBC 06/26/2021 0-5  0 - 5 /hpf Final    Bacteria 06/26/2021 Negative  Negative /hpf Final    Mucus 06/26/2021 3+  /lpf Final    AMPHETAMINES 06/26/2021 Positive* Negative   Final    BARBITURATES 06/26/2021 Negative  Negative   Final    BENZODIAZEPINES 06/26/2021 Positive* Negative   Final    COCAINE 06/26/2021 Negative  Negative   Final    METHADONE 06/26/2021 Negative  Negative   Final    OPIATES 06/26/2021 Negative  Negative   Final    PCP(PHENCYCLIDINE) 06/26/2021 Negative  Negative   Final    THC (TH-CANNABINOL) 06/26/2021 Negative  Negative   Final    Drug screen comment 06/26/2021 This test is a screen for drugs of abuse in a medical setting only (i.e., they are unconfirmed results and as such must not be used for non-medical purposes, e.g.,employment testing, legal testing). Due to its inherent nature, false positive (FP) and false negative (FN) results may be obtained. Therefore, if necessary for medical care, recommend confirmation of positive findings by GC/MS. Final    HCG, Ql. 06/26/2021 Negative  Negative   Final    SARS-CoV-2 06/26/2021 Please find results under separate order    Final    Specimen source 06/26/2021 Nasopharyngeal    Final    COVID-19 rapid test 06/26/2021 Not Detected  Not Detected   Final     No results found. DISPOSITION:  Patient to f/u with, psychiatric and psychotherapy appointments. FOLLOW-UP CARE:    Activity as tolerated  Regular Diet  Wound Care: none needed. Follow-up Information     Follow up With Specialties Details Why Contact Ronda    Wili Peacock   Go on 7/1/2021 appt scheduled for 6 [address] [facility] [facility], [address] [facility]    [address] [facility], [address]  Call for empowerment and resiliant based support groups  [address], [address] [facility] [address], [address], [address] [place] [address]  [phone]      24hr Discharge Follow Up  Call on 7/2/2021 24hr Discharge Follow Up Writer spoke with pt. Pt said that she saw her therapist last night and is 'doing really well'. Writer encouraged pt to reach out if she needs anything further                  PROGNOSIS:   Limited---- based on nature of patient's pathology/ies and treatment compliance issues. Prognosis is greatly dependent upon patient's ability to follow up with psychiatric/psychotherapy appointments as well as to comply with psychiatric medications as prescribed.             DISCHARGE MEDICATIONS:    Informed consent given for the use of following psychotropic medications:  Discharge Medication List as of 7/1/2021 10:30 AM      START taking these medications Details   traZODone (DESYREL) 50 mg tablet Take 1 Tablet by mouth nightly as needed for Sleep (For insomnia). , Normal, Disp-30 Tablet, R-0         CONTINUE these medications which have CHANGED    Details   buPROPion XL (WELLBUTRIN XL) 150 mg tablet Take 1 Tablet by mouth daily. , Normal, Disp-30 Tablet, R-0      sertraline (ZOLOFT) 50 mg tablet Take 1 Tablet by mouth daily. Indications: anxiousness associated with depression, panic disorder, Normal, Disp-30 Tablet, R-0         CONTINUE these medications which have NOT CHANGED    Details   ALPRAZolam (XANAX) 0.25 mg tablet Take 0.25 mg by mouth daily as needed for Anxiety. , Historical Med                    Signed:  Raysa Lopez MD

## 2022-01-12 NOTE — BH NOTES
Patient affect flat, tearful and sad. Patient isolative in social situations. Patient was medication compliant. Patient tearful as she talked about her  of 14 years, who told her not to call him and hung up on her. Stated she regretted her actions at home,in the suicide attempt. Patient denied SI, HI, AH, and VH. She stated she was having  Increasing anxiety, atarax given p.o. Patient remains on close observation, Q 15 minute checks. Patient: Marilee Marks    Procedure: Procedure(s):  MAMMOPLASTY, REDUCTION, BILATERAL       Diagnosis:Hypertrophy of breast [N62]  Diagnosis Additional Information: No value filed.    Anesthesia Type:  General    Note:  Disposition: Outpatient   Postop Pain Control: Uneventful            Sign Out: Well controlled pain   PONV: No   Neuro/Psych: Uneventful            Sign Out: Acceptable/Baseline neuro status   Airway/Respiratory: Uneventful            Sign Out: Acceptable/Baseline resp. status   CV/Hemodynamics: Uneventful            Sign Out: Acceptable CV status; No obvious hypovolemia; No obvious fluid overload   Other NRE: NONE   DID A NON-ROUTINE EVENT OCCUR? No           Last vitals:  Vitals Value Taken Time   /68 01/12/22 1500   Temp 36.6  C (97.8  F) 01/12/22 1500   Pulse 87 01/12/22 1504   Resp 7 01/12/22 1504   SpO2 92 % 01/12/22 1504   Vitals shown include unvalidated device data.    Electronically Signed By: Mau Chavis MD  January 12, 2022  3:26 PM

## 2022-03-18 PROBLEM — F32.9 MAJOR DEPRESSION: Status: ACTIVE | Noted: 2021-06-27

## 2022-03-19 PROBLEM — Z98.890 S/P ABDOMINOPLASTY: Status: ACTIVE | Noted: 2019-11-25

## 2024-02-16 ENCOUNTER — HOSPITAL ENCOUNTER (EMERGENCY)
Facility: HOSPITAL | Age: 39
Discharge: HOME | End: 2024-02-16
Payer: COMMERCIAL

## 2024-02-16 VITALS
WEIGHT: 200 LBS | SYSTOLIC BLOOD PRESSURE: 113 MMHG | TEMPERATURE: 98.1 F | DIASTOLIC BLOOD PRESSURE: 73 MMHG | HEIGHT: 68 IN | OXYGEN SATURATION: 100 % | RESPIRATION RATE: 20 BRPM | BODY MASS INDEX: 30.31 KG/M2 | HEART RATE: 107 BPM

## 2024-02-16 DIAGNOSIS — M54.50 ACUTE BILATERAL LOW BACK PAIN, UNSPECIFIED WHETHER SCIATICA PRESENT: Primary | ICD-10-CM

## 2024-02-16 PROCEDURE — 96372 THER/PROPH/DIAG INJ SC/IM: CPT

## 2024-02-16 PROCEDURE — 2500000003 HC RX 250 WO HCPCS: Performed by: NURSE PRACTITIONER

## 2024-02-16 PROCEDURE — 99284 EMERGENCY DEPT VISIT MOD MDM: CPT

## 2024-02-16 PROCEDURE — 6370000000 HC RX 637 (ALT 250 FOR IP): Performed by: NURSE PRACTITIONER

## 2024-02-16 RX ORDER — HYDROCODONE BITARTRATE AND ACETAMINOPHEN 5; 325 MG/1; MG/1
1 TABLET ORAL EVERY 6 HOURS PRN
Qty: 12 TABLET | Refills: 0 | Status: SHIPPED | OUTPATIENT
Start: 2024-02-16 | End: 2024-02-19

## 2024-02-16 RX ORDER — CYCLOBENZAPRINE HCL 10 MG
10 TABLET ORAL
Status: COMPLETED | OUTPATIENT
Start: 2024-02-16 | End: 2024-02-16

## 2024-02-16 RX ORDER — HYDROMORPHONE HYDROCHLORIDE 1 MG/ML
0.5 INJECTION, SOLUTION INTRAMUSCULAR; INTRAVENOUS; SUBCUTANEOUS
Status: COMPLETED | OUTPATIENT
Start: 2024-02-16 | End: 2024-02-16

## 2024-02-16 RX ORDER — IBUPROFEN 600 MG/1
600 TABLET ORAL EVERY 6 HOURS PRN
Qty: 20 TABLET | Refills: 0 | Status: SHIPPED | OUTPATIENT
Start: 2024-02-16

## 2024-02-16 RX ORDER — CYCLOBENZAPRINE HCL 10 MG
10 TABLET ORAL 3 TIMES DAILY PRN
Qty: 21 TABLET | Refills: 0 | Status: SHIPPED | OUTPATIENT
Start: 2024-02-16 | End: 2024-02-26

## 2024-02-16 RX ADMIN — CYCLOBENZAPRINE 10 MG: 10 TABLET, FILM COATED ORAL at 10:16

## 2024-02-16 RX ADMIN — HYDROMORPHONE HYDROCHLORIDE 0.5 MG: 1 INJECTION, SOLUTION INTRAMUSCULAR; INTRAVENOUS; SUBCUTANEOUS at 10:16

## 2024-02-16 NOTE — ED TRIAGE NOTES
Patient states pain to lower back several days, this am had GLF due to back pain,moving down both legs.

## 2024-02-16 NOTE — ED PROVIDER NOTES
[facility] EMERGENCY DEPT  EMERGENCY DEPARTMENT HISTORY AND PHYSICAL EXAM      Date: 2024  Patient Name: Katia Mayen  MRN: [id number]  YOB: 1985  Date of evaluation: 2024  Provider: LUMA Castle NP   Note Started: 11:33 AM EST 24    HISTORY OF PRESENT ILLNESS     Chief Complaint   Patient presents with    Back Pain       History Provided By: Patient    HPI: Katia Mayen is a 38 y.o. female with past medical history as listed below presents to the ER for back pain.  Patient has back pain times a few days.  Patient also has sciatica.  Patient denies any saddle anesthesia or incontinent episodes    PAST MEDICAL HISTORY   Past Medical History:  Past Medical History:   Diagnosis Date    Anxiety     Constipation        Past Surgical History:  Past Surgical History:   Procedure Laterality Date    BREAST SURGERY Bilateral 2019    BILATERAL BREAST LIFT WITH AUGMENTATION WITH FAT GRAFTING; ABDOMINOPLASTY performed by Latonya Vasquez MD at [facility] AMBULATORY OR    GASTRIC BYPASS SURGERY N/A     LAPAROSCOPY N/A 2009    MASTECTOMY, BILATERAL Left 2016    Foot- with hardware    OTHER SURGICAL HISTORY Bilateral 2019    Brachioplasty    TONSILLECTOMY         Family History:  Family History   Problem Relation Age of Onset    Diabetes Father     Malig Hypertherm Neg Hx     Hypertension Father     High Cholesterol Father     No Known Problems Mother        Social History:  Social History     Tobacco Use    Smoking status: Former     Current packs/day: 0.00     Types: Cigarettes     Quit date: 2016     Years since quittin.2    Smokeless tobacco: Never   Substance Use Topics    Alcohol use: Yes    Drug use: Never       Allergies:  Allergies   Allergen Reactions    Heather Other (See Comments)     Mouth sores    Sores in mouth       PCP: Dee Dee Fontenot MD    Current Meds:   No current facility-administered medications for this encounter.     Current Outpatient Medications

## (undated) DEVICE — STAPLER SKIN H3.9MM WIRE DIA0.58MM CRWN 6.9MM 35 STPL ROT

## (undated) DEVICE — INSULATED BLADE ELECTRODE;CAUTION: FOR MANUFACTURING, PROCESSING, OR REPACKING.: Brand: EDGE

## (undated) DEVICE — INFECTION CONTROL KIT SYS

## (undated) DEVICE — SUTURE CHROMIC GUT SZ 5-0 L18IN ABSRB BRN P-3 L13MM 3/8 CIR 687G

## (undated) DEVICE — 3M™ TEGADERM™ TRANSPARENT FILM DRESSING FRAME STYLE, 1624W, 2-3/8 IN X 2-3/4 IN (6 CM X 7 CM), 100/CT 4CT/CASE: Brand: 3M™ TEGADERM™

## (undated) DEVICE — PACK,ORTOHMAX/CVMAX,UNIVERSAL,5/CS: Brand: MEDLINE

## (undated) DEVICE — PENCIL ES CRD L10FT ROCK SWCH S STL HEX LOK BLDE ELECTRD

## (undated) DEVICE — PAD,ABDOMINAL,5"X9",ST,LF,25/BX: Brand: MEDLINE INDUSTRIES, INC.

## (undated) DEVICE — SUTURE NONABSORBABLE MONOFILAMENT 4-0 PS-2 18 IN BLU PROLENE 8682H

## (undated) DEVICE — CANISTER, RIGID, 3000CC: Brand: MEDLINE INDUSTRIES, INC.

## (undated) DEVICE — ELECTRODE NDL 2.8IN COAT VALLEYLAB

## (undated) DEVICE — STERILE POLYISOPRENE POWDER-FREE SURGICAL GLOVES: Brand: PROTEXIS

## (undated) DEVICE — TUBING SUCT L9FT FOR AUTOFUSE INFLTR SYS

## (undated) DEVICE — MARKER,SKIN,WI/RULER AND LABELS: Brand: MEDLINE

## (undated) DEVICE — SYR 50ML LR LCK 1ML GRAD NSAF --

## (undated) DEVICE — 3M™ TEGADERM™ TRANSPARENT FILM DRESSING FRAME STYLE, 1626W, 4 IN X 4-3/4 IN (10 CM X 12 CM), 50/CT 4CT/CASE: Brand: 3M™ TEGADERM™

## (undated) DEVICE — OCCLUSIVE GAUZE STRIP,3% BISMUTH TRIBROMOPHENATE IN PETROLATUM BLEND: Brand: XEROFORM

## (undated) DEVICE — 2DE12 3-0 UNDYD MONODERM 14X14: Brand: 2DE12 3-0 UNDYD MONODERM 14X14

## (undated) DEVICE — SUT CHRMC 4-0 18IN PS2 BRN --

## (undated) DEVICE — STRAP,POSITIONING,KNEE/BODY,FOAM,4X60": Brand: MEDLINE

## (undated) DEVICE — DRAPE FLD WRM W44XL66IN C6L FOR INTRATEMP SYS THERMABASIN

## (undated) DEVICE — STAPLER SKIN 35CT WD STRL DISP -- MULTIFIRE PREMIUM

## (undated) DEVICE — 5MM SINGLE USE CANNULA, 10MM PORT, 30CM LONG, FLARED MERCEDES: Brand: MICROAIRE®

## (undated) DEVICE — SUTURE MCRYL SZ 3-0 L27IN ABSRB UD L24MM PS-1 3/8 CIR PRIM Y936H

## (undated) DEVICE — SUTURE STRATAFIX SPRL SZ 4-0 L12IN ABSRB UD FS-2 L19MM 3/8 SXMP2B409

## (undated) DEVICE — ASPIRATION TUBING SET, DISPOSABLE: Brand: MICROAIRE®

## (undated) DEVICE — DRAPE,CHEST,FENES,15X10,STERIL: Brand: MEDLINE

## (undated) DEVICE — SUT SLK 2-0SH 30IN BLK --

## (undated) DEVICE — SUTURE PDS II SZ 0 L60IN ABSRB VLT L48MM CTX 1/2 CIR Z990G

## (undated) DEVICE — DRAIN SURG 15FR SIL RND CHN W/ TRCR FULL FLUT DBL WRP TRAD

## (undated) DEVICE — Z DISCONTINUED PER MEDLINE PACK PROCEDURE SURG PLAS

## (undated) DEVICE — SUTURE VCRL SZ 2-0 L27IN ABSRB UD L26MM SH 1/2 CIR J417H

## (undated) DEVICE — BANDAGE COMPR W6INXL10YD ST M E WHITE/BEIGE

## (undated) DEVICE — BINDER ABD S/M 12X30-45IN --

## (undated) DEVICE — SOLUTION IV 1000ML 0.9% SOD CHL

## (undated) DEVICE — SUTURE MCRYL SZ 4-0 L27IN ABSRB UD L19MM PS-2 1/2 CIR PRIM Y426H

## (undated) DEVICE — SUTURE PDS II SZ 2-0 L27IN ABSRB VLT SH L26MM 1/2 CIR Z317H

## (undated) DEVICE — TUBING, SUCTION, 1/4" X 10', STRAIGHT: Brand: MEDLINE

## (undated) DEVICE — SPONGE LAP 18X18IN STRL -- 5/PK

## (undated) DEVICE — BLADE ELECTRODE: Brand: EDGE

## (undated) DEVICE — BANDAGE,GAUZE,BULKEE II,4.5"X4.1YD,STRL: Brand: MEDLINE

## (undated) DEVICE — SYSTEM SKIN CLSR 22CM DERMBND PRINEO

## (undated) DEVICE — 3M™ IOBAN™ 2 ANTIMICROBIAL INCISE DRAPE 6648EZ: Brand: IOBAN™ 2

## (undated) DEVICE — INTENDED FOR TISSUE SEPARATION, AND OTHER PROCEDURES THAT REQUIRE A SHARP SURGICAL BLADE TO PUNCTURE OR CUT.: Brand: BARD-PARKER ® CARBON RIB-BACK BLADES

## (undated) DEVICE — SUT PROL 0 30IN CT1 BLU --

## (undated) DEVICE — AEGIS 1" DISK 4MM HOLE, PEEL OPEN: Brand: MEDLINE

## (undated) DEVICE — COVER LT HNDL PLAS RIG 1 PER PK

## (undated) DEVICE — INSULATED BLADE ELECTRODE: Brand: EDGE

## (undated) DEVICE — TOTAL TRAY, 16FR 10ML SIL FOLEY, URN: Brand: MEDLINE

## (undated) DEVICE — SUT PROL 2-0 30IN SH BLU --

## (undated) DEVICE — STERILE POLYISOPRENE POWDER-FREE SURGICAL GLOVES WITH EMOLLIENT COATING: Brand: PROTEXIS

## (undated) DEVICE — (D)PREP SKN CHLRAPRP APPL 26ML -- CONVERT TO ITEM 371833

## (undated) DEVICE — YANKAUER,TAPERED BULBOUS TIP,W/O VENT: Brand: MEDLINE

## (undated) DEVICE — SUTURE MCRYL SZ 3-0 L27IN ABSRB UD L26MM SH 1/2 CIR Y416H

## (undated) DEVICE — RESERVOIR,SUCTION,100CC,SILICONE: Brand: MEDLINE